# Patient Record
Sex: MALE | Race: BLACK OR AFRICAN AMERICAN | NOT HISPANIC OR LATINO | Employment: OTHER | URBAN - METROPOLITAN AREA
[De-identification: names, ages, dates, MRNs, and addresses within clinical notes are randomized per-mention and may not be internally consistent; named-entity substitution may affect disease eponyms.]

---

## 2022-09-16 ENCOUNTER — HOSPITAL ENCOUNTER (INPATIENT)
Facility: HOSPITAL | Age: 57
LOS: 1 days | Discharge: HOME | DRG: 202 | End: 2022-09-17
Attending: EMERGENCY MEDICINE | Admitting: INTERNAL MEDICINE
Payer: OTHER GOVERNMENT

## 2022-09-16 ENCOUNTER — APPOINTMENT (OUTPATIENT)
Dept: RADIOLOGY | Facility: HOSPITAL | Age: 57
DRG: 202 | End: 2022-09-16
Payer: OTHER GOVERNMENT

## 2022-09-16 DIAGNOSIS — J45.901 ASTHMA EXACERBATION: Primary | ICD-10-CM

## 2022-09-16 DIAGNOSIS — J96.90 RESPIRATORY FAILURE (HCC): ICD-10-CM

## 2022-09-16 PROBLEM — B20 HIV INFECTION (HCC): Status: ACTIVE | Noted: 2022-09-16

## 2022-09-16 PROBLEM — J45.41 MODERATE PERSISTENT ASTHMA WITH ACUTE EXACERBATION: Status: ACTIVE | Noted: 2022-09-16

## 2022-09-16 LAB
ANION GAP SERPL CALCULATED.3IONS-SCNC: 8 MMOL/L (ref 4–13)
BASOPHILS # BLD MANUAL: 0 THOUSAND/UL (ref 0–0.1)
BASOPHILS NFR MAR MANUAL: 0 % (ref 0–1)
BUN SERPL-MCNC: 14 MG/DL (ref 5–25)
CALCIUM SERPL-MCNC: 9.7 MG/DL (ref 8.4–10.2)
CHLORIDE SERPL-SCNC: 107 MMOL/L (ref 96–108)
CO2 SERPL-SCNC: 27 MMOL/L (ref 21–32)
CREAT SERPL-MCNC: 0.99 MG/DL (ref 0.6–1.3)
EOSINOPHIL # BLD MANUAL: 1.08 THOUSAND/UL (ref 0–0.4)
EOSINOPHIL NFR BLD MANUAL: 15 % (ref 0–6)
ERYTHROCYTE [DISTWIDTH] IN BLOOD BY AUTOMATED COUNT: 13.3 % (ref 11.6–15.1)
GFR SERPL CREATININE-BSD FRML MDRD: 85 ML/MIN/1.73SQ M
GLUCOSE SERPL-MCNC: 82 MG/DL (ref 65–140)
HCT VFR BLD AUTO: 42 % (ref 36.5–49.3)
HGB BLD-MCNC: 15 G/DL (ref 12–17)
LYMPHOCYTES # BLD AUTO: 2.72 THOUSAND/UL (ref 0.6–4.47)
LYMPHOCYTES # BLD AUTO: 38 % (ref 14–44)
MCH RBC QN AUTO: 30.1 PG (ref 26.8–34.3)
MCHC RBC AUTO-ENTMCNC: 35.7 G/DL (ref 31.4–37.4)
MCV RBC AUTO: 84 FL (ref 82–98)
MONOCYTES # BLD AUTO: 0.65 THOUSAND/UL (ref 0–1.22)
MONOCYTES NFR BLD: 9 % (ref 4–12)
NEUTROPHILS # BLD MANUAL: 2.29 THOUSAND/UL (ref 1.85–7.62)
NEUTS SEG NFR BLD AUTO: 32 % (ref 43–75)
PLATELET # BLD AUTO: 211 THOUSANDS/UL (ref 149–390)
PLATELET BLD QL SMEAR: ADEQUATE
PMV BLD AUTO: 11.6 FL (ref 8.9–12.7)
POTASSIUM SERPL-SCNC: 3.4 MMOL/L (ref 3.5–5.3)
RBC # BLD AUTO: 4.98 MILLION/UL (ref 3.88–5.62)
RBC MORPH BLD: NORMAL
SODIUM SERPL-SCNC: 142 MMOL/L (ref 135–147)
VARIANT LYMPHS # BLD AUTO: 6 %
WBC # BLD AUTO: 7.17 THOUSAND/UL (ref 4.31–10.16)

## 2022-09-16 PROCEDURE — 99291 CRITICAL CARE FIRST HOUR: CPT

## 2022-09-16 PROCEDURE — 94664 DEMO&/EVAL PT USE INHALER: CPT

## 2022-09-16 PROCEDURE — 96361 HYDRATE IV INFUSION ADD-ON: CPT

## 2022-09-16 PROCEDURE — 85027 COMPLETE CBC AUTOMATED: CPT | Performed by: PHYSICIAN ASSISTANT

## 2022-09-16 PROCEDURE — 96365 THER/PROPH/DIAG IV INF INIT: CPT

## 2022-09-16 PROCEDURE — 94760 N-INVAS EAR/PLS OXIMETRY 1: CPT

## 2022-09-16 PROCEDURE — 94644 CONT INHLJ TX 1ST HOUR: CPT

## 2022-09-16 PROCEDURE — 71045 X-RAY EXAM CHEST 1 VIEW: CPT

## 2022-09-16 PROCEDURE — 80048 BASIC METABOLIC PNL TOTAL CA: CPT | Performed by: PHYSICIAN ASSISTANT

## 2022-09-16 PROCEDURE — 85007 BL SMEAR W/DIFF WBC COUNT: CPT | Performed by: PHYSICIAN ASSISTANT

## 2022-09-16 PROCEDURE — 94002 VENT MGMT INPAT INIT DAY: CPT

## 2022-09-16 PROCEDURE — 99291 CRITICAL CARE FIRST HOUR: CPT | Performed by: PHYSICIAN ASSISTANT

## 2022-09-16 PROCEDURE — 96375 TX/PRO/DX INJ NEW DRUG ADDON: CPT

## 2022-09-16 PROCEDURE — 96372 THER/PROPH/DIAG INJ SC/IM: CPT

## 2022-09-16 PROCEDURE — 94640 AIRWAY INHALATION TREATMENT: CPT

## 2022-09-16 PROCEDURE — 36415 COLL VENOUS BLD VENIPUNCTURE: CPT | Performed by: PHYSICIAN ASSISTANT

## 2022-09-16 RX ORDER — EPINEPHRINE 1 MG/ML
0.3 INJECTION, SOLUTION, CONCENTRATE INTRAVENOUS ONCE
Status: COMPLETED | OUTPATIENT
Start: 2022-09-16 | End: 2022-09-16

## 2022-09-16 RX ORDER — ACETAMINOPHEN 325 MG/1
650 TABLET ORAL EVERY 6 HOURS PRN
Status: DISCONTINUED | OUTPATIENT
Start: 2022-09-16 | End: 2022-09-17 | Stop reason: HOSPADM

## 2022-09-16 RX ORDER — SODIUM CHLORIDE FOR INHALATION 0.9 %
3 VIAL, NEBULIZER (ML) INHALATION ONCE
Status: COMPLETED | OUTPATIENT
Start: 2022-09-16 | End: 2022-09-16

## 2022-09-16 RX ORDER — MAGNESIUM SULFATE HEPTAHYDRATE 40 MG/ML
2 INJECTION, SOLUTION INTRAVENOUS ONCE
Status: COMPLETED | OUTPATIENT
Start: 2022-09-16 | End: 2022-09-16

## 2022-09-16 RX ORDER — ALBUTEROL SULFATE 2.5 MG/3ML
5 SOLUTION RESPIRATORY (INHALATION) ONCE
Status: COMPLETED | OUTPATIENT
Start: 2022-09-16 | End: 2022-09-16

## 2022-09-16 RX ORDER — DEXAMETHASONE SODIUM PHOSPHATE 4 MG/ML
10 INJECTION, SOLUTION INTRA-ARTICULAR; INTRALESIONAL; INTRAMUSCULAR; INTRAVENOUS; SOFT TISSUE ONCE
Status: COMPLETED | OUTPATIENT
Start: 2022-09-16 | End: 2022-09-16

## 2022-09-16 RX ORDER — ALBUTEROL SULFATE 2.5 MG/3ML
2.5 SOLUTION RESPIRATORY (INHALATION) EVERY 4 HOURS PRN
Status: DISCONTINUED | OUTPATIENT
Start: 2022-09-16 | End: 2022-09-17 | Stop reason: HOSPADM

## 2022-09-16 RX ORDER — LEVALBUTEROL 1.25 MG/.5ML
1.25 SOLUTION, CONCENTRATE RESPIRATORY (INHALATION)
Status: DISCONTINUED | OUTPATIENT
Start: 2022-09-17 | End: 2022-09-17 | Stop reason: HOSPADM

## 2022-09-16 RX ORDER — HEPARIN SODIUM 5000 [USP'U]/ML
5000 INJECTION, SOLUTION INTRAVENOUS; SUBCUTANEOUS EVERY 8 HOURS SCHEDULED
Status: DISCONTINUED | OUTPATIENT
Start: 2022-09-16 | End: 2022-09-17 | Stop reason: HOSPADM

## 2022-09-16 RX ADMIN — ISODIUM CHLORIDE 3 ML: 0.03 SOLUTION RESPIRATORY (INHALATION) at 21:49

## 2022-09-16 RX ADMIN — IPRATROPIUM BROMIDE 1 MG: 0.5 SOLUTION RESPIRATORY (INHALATION) at 21:49

## 2022-09-16 RX ADMIN — IPRATROPIUM BROMIDE 0.5 MG: 0.5 SOLUTION RESPIRATORY (INHALATION) at 21:01

## 2022-09-16 RX ADMIN — ALBUTEROL SULFATE 10 MG: 2.5 SOLUTION RESPIRATORY (INHALATION) at 21:48

## 2022-09-16 RX ADMIN — MAGNESIUM SULFATE HEPTAHYDRATE 2 G: 40 INJECTION, SOLUTION INTRAVENOUS at 21:39

## 2022-09-16 RX ADMIN — DEXAMETHASONE SODIUM PHOSPHATE 10 MG: 4 INJECTION, SOLUTION INTRAMUSCULAR; INTRAVENOUS at 21:09

## 2022-09-16 RX ADMIN — EPINEPHRINE 0.3 MG: 1 INJECTION, SOLUTION, CONCENTRATE INTRAVENOUS at 21:43

## 2022-09-16 RX ADMIN — SODIUM CHLORIDE 1000 ML: 0.9 INJECTION, SOLUTION INTRAVENOUS at 21:08

## 2022-09-16 RX ADMIN — ALBUTEROL SULFATE 5 MG: 2.5 SOLUTION RESPIRATORY (INHALATION) at 21:00

## 2022-09-17 VITALS
OXYGEN SATURATION: 94 % | WEIGHT: 128.53 LBS | BODY MASS INDEX: 19.48 KG/M2 | RESPIRATION RATE: 37 BRPM | DIASTOLIC BLOOD PRESSURE: 70 MMHG | TEMPERATURE: 97.4 F | SYSTOLIC BLOOD PRESSURE: 132 MMHG | HEART RATE: 104 BPM | HEIGHT: 68 IN

## 2022-09-17 LAB
ALBUMIN SERPL BCP-MCNC: 3.9 G/DL (ref 3.5–5)
ALP SERPL-CCNC: 53 U/L (ref 34–104)
ALT SERPL W P-5'-P-CCNC: 9 U/L (ref 7–52)
ANION GAP SERPL CALCULATED.3IONS-SCNC: 14 MMOL/L (ref 4–13)
ANION GAP SERPL CALCULATED.3IONS-SCNC: 7 MMOL/L (ref 4–13)
AST SERPL W P-5'-P-CCNC: 17 U/L (ref 13–39)
BASOPHILS # BLD AUTO: 0.01 THOUSANDS/ΜL (ref 0–0.1)
BASOPHILS NFR BLD AUTO: 0 % (ref 0–1)
BILIRUB SERPL-MCNC: 0.3 MG/DL (ref 0.2–1)
BUN SERPL-MCNC: 14 MG/DL (ref 5–25)
BUN SERPL-MCNC: 15 MG/DL (ref 5–25)
CALCIUM SERPL-MCNC: 9 MG/DL (ref 8.4–10.2)
CALCIUM SERPL-MCNC: 9 MG/DL (ref 8.4–10.2)
CHLORIDE SERPL-SCNC: 104 MMOL/L (ref 96–108)
CHLORIDE SERPL-SCNC: 105 MMOL/L (ref 96–108)
CO2 SERPL-SCNC: 20 MMOL/L (ref 21–32)
CO2 SERPL-SCNC: 27 MMOL/L (ref 21–32)
CREAT SERPL-MCNC: 1.04 MG/DL (ref 0.6–1.3)
CREAT SERPL-MCNC: 1.2 MG/DL (ref 0.6–1.3)
EOSINOPHIL # BLD AUTO: 0.01 THOUSAND/ΜL (ref 0–0.61)
EOSINOPHIL NFR BLD AUTO: 0 % (ref 0–6)
ERYTHROCYTE [DISTWIDTH] IN BLOOD BY AUTOMATED COUNT: 13.5 % (ref 11.6–15.1)
GFR SERPL CREATININE-BSD FRML MDRD: 68 ML/MIN/1.73SQ M
GFR SERPL CREATININE-BSD FRML MDRD: 81 ML/MIN/1.73SQ M
GLUCOSE SERPL-MCNC: 108 MG/DL (ref 65–140)
GLUCOSE SERPL-MCNC: 145 MG/DL (ref 65–140)
HCT VFR BLD AUTO: 38.2 % (ref 36.5–49.3)
HGB BLD-MCNC: 13.8 G/DL (ref 12–17)
IMM GRANULOCYTES # BLD AUTO: 0.03 THOUSAND/UL (ref 0–0.2)
IMM GRANULOCYTES NFR BLD AUTO: 0 % (ref 0–2)
LYMPHOCYTES # BLD AUTO: 0.47 THOUSANDS/ΜL (ref 0.6–4.47)
LYMPHOCYTES NFR BLD AUTO: 7 % (ref 14–44)
MAGNESIUM SERPL-MCNC: 2 MG/DL (ref 1.9–2.7)
MCH RBC QN AUTO: 30.7 PG (ref 26.8–34.3)
MCHC RBC AUTO-ENTMCNC: 36.1 G/DL (ref 31.4–37.4)
MCV RBC AUTO: 85 FL (ref 82–98)
MONOCYTES # BLD AUTO: 0.05 THOUSAND/ΜL (ref 0.17–1.22)
MONOCYTES NFR BLD AUTO: 1 % (ref 4–12)
NEUTROPHILS # BLD AUTO: 6.17 THOUSANDS/ΜL (ref 1.85–7.62)
NEUTS SEG NFR BLD AUTO: 92 % (ref 43–75)
NRBC BLD AUTO-RTO: 0 /100 WBCS
PLATELET # BLD AUTO: 218 THOUSANDS/UL (ref 149–390)
PMV BLD AUTO: 11.7 FL (ref 8.9–12.7)
POTASSIUM SERPL-SCNC: 3.8 MMOL/L (ref 3.5–5.3)
POTASSIUM SERPL-SCNC: 4.6 MMOL/L (ref 3.5–5.3)
PROT SERPL-MCNC: 6.3 G/DL (ref 6.4–8.4)
RBC # BLD AUTO: 4.5 MILLION/UL (ref 3.88–5.62)
SODIUM SERPL-SCNC: 138 MMOL/L (ref 135–147)
SODIUM SERPL-SCNC: 139 MMOL/L (ref 135–147)
WBC # BLD AUTO: 6.74 THOUSAND/UL (ref 4.31–10.16)

## 2022-09-17 PROCEDURE — NC001 PR NO CHARGE: Performed by: NURSE PRACTITIONER

## 2022-09-17 PROCEDURE — 80053 COMPREHEN METABOLIC PANEL: CPT | Performed by: NURSE PRACTITIONER

## 2022-09-17 PROCEDURE — 94760 N-INVAS EAR/PLS OXIMETRY 1: CPT

## 2022-09-17 PROCEDURE — 99291 CRITICAL CARE FIRST HOUR: CPT | Performed by: NURSE PRACTITIONER

## 2022-09-17 PROCEDURE — 83735 ASSAY OF MAGNESIUM: CPT | Performed by: NURSE PRACTITIONER

## 2022-09-17 PROCEDURE — 80048 BASIC METABOLIC PNL TOTAL CA: CPT | Performed by: NURSE PRACTITIONER

## 2022-09-17 PROCEDURE — 85027 COMPLETE CBC AUTOMATED: CPT | Performed by: NURSE PRACTITIONER

## 2022-09-17 PROCEDURE — 94640 AIRWAY INHALATION TREATMENT: CPT

## 2022-09-17 RX ORDER — PREDNISONE 10 MG/1
20 TABLET ORAL DAILY
Qty: 1 TABLET | Refills: 0 | Status: SHIPPED | OUTPATIENT
Start: 2022-09-20 | End: 2022-09-21

## 2022-09-17 RX ORDER — PREDNISONE 20 MG/1
40 TABLET ORAL DAILY
Status: DISCONTINUED | OUTPATIENT
Start: 2022-09-18 | End: 2022-09-17 | Stop reason: HOSPADM

## 2022-09-17 RX ORDER — PREDNISONE 10 MG/1
10 TABLET ORAL DAILY
Qty: 1 TABLET | Refills: 0 | Status: SHIPPED | OUTPATIENT
Start: 2022-09-21 | End: 2022-09-22

## 2022-09-17 RX ORDER — PREDNISONE 20 MG/1
20 TABLET ORAL DAILY
Status: DISCONTINUED | OUTPATIENT
Start: 2022-09-20 | End: 2022-09-17 | Stop reason: HOSPADM

## 2022-09-17 RX ORDER — PREDNISONE 10 MG/1
30 TABLET ORAL DAILY
Qty: 3 TABLET | Refills: 0 | Status: SHIPPED | OUTPATIENT
Start: 2022-09-19 | End: 2022-09-20

## 2022-09-17 RX ORDER — PREDNISONE 10 MG/1
40 TABLET ORAL DAILY
Qty: 2 TABLET | Refills: 0 | Status: SHIPPED | OUTPATIENT
Start: 2022-09-18 | End: 2022-09-19

## 2022-09-17 RX ORDER — PREDNISONE 10 MG/1
10 TABLET ORAL DAILY
Status: DISCONTINUED | OUTPATIENT
Start: 2022-09-21 | End: 2022-09-17 | Stop reason: HOSPADM

## 2022-09-17 RX ORDER — SODIUM CHLORIDE, SODIUM GLUCONATE, SODIUM ACETATE, POTASSIUM CHLORIDE, MAGNESIUM CHLORIDE, SODIUM PHOSPHATE, DIBASIC, AND POTASSIUM PHOSPHATE .53; .5; .37; .037; .03; .012; .00082 G/100ML; G/100ML; G/100ML; G/100ML; G/100ML; G/100ML; G/100ML
500 INJECTION, SOLUTION INTRAVENOUS ONCE
Status: COMPLETED | OUTPATIENT
Start: 2022-09-17 | End: 2022-09-17

## 2022-09-17 RX ADMIN — HEPARIN SODIUM 5000 UNITS: 5000 INJECTION, SOLUTION INTRAVENOUS; SUBCUTANEOUS at 00:00

## 2022-09-17 RX ADMIN — PREDNISONE 50 MG: 10 TABLET ORAL at 13:22

## 2022-09-17 RX ADMIN — LEVALBUTEROL HYDROCHLORIDE 1.25 MG: 1.25 SOLUTION, CONCENTRATE RESPIRATORY (INHALATION) at 03:00

## 2022-09-17 RX ADMIN — IPRATROPIUM BROMIDE 0.5 MG: 0.5 SOLUTION RESPIRATORY (INHALATION) at 03:00

## 2022-09-17 RX ADMIN — HEPARIN SODIUM 5000 UNITS: 5000 INJECTION, SOLUTION INTRAVENOUS; SUBCUTANEOUS at 13:22

## 2022-09-17 RX ADMIN — HEPARIN SODIUM 5000 UNITS: 5000 INJECTION, SOLUTION INTRAVENOUS; SUBCUTANEOUS at 05:49

## 2022-09-17 RX ADMIN — IPRATROPIUM BROMIDE 0.5 MG: 0.5 SOLUTION RESPIRATORY (INHALATION) at 13:00

## 2022-09-17 RX ADMIN — IPRATROPIUM BROMIDE 0.5 MG: 0.5 SOLUTION RESPIRATORY (INHALATION) at 07:00

## 2022-09-17 RX ADMIN — LEVALBUTEROL HYDROCHLORIDE 1.25 MG: 1.25 SOLUTION, CONCENTRATE RESPIRATORY (INHALATION) at 07:00

## 2022-09-17 RX ADMIN — LEVALBUTEROL HYDROCHLORIDE 1.25 MG: 1.25 SOLUTION, CONCENTRATE RESPIRATORY (INHALATION) at 13:00

## 2022-09-17 RX ADMIN — SODIUM CHLORIDE, SODIUM GLUCONATE, SODIUM ACETATE, POTASSIUM CHLORIDE, MAGNESIUM CHLORIDE, SODIUM PHOSPHATE, DIBASIC, AND POTASSIUM PHOSPHATE 500 ML: .53; .5; .37; .037; .03; .012; .00082 INJECTION, SOLUTION INTRAVENOUS at 08:00

## 2022-09-17 NOTE — H&P
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 [employer] and developed SOB  · Home neb tx x2-received albutrerol neb 5 mg, montanez neb, decadron, epi and magnesium in ED  · Required BiPap 10/5 40% for short period of time and was transitioned to 4 L NC upon arrival to ICU  · Continue scheduled nebs  · Will monitor off steroids for now  · O2 prn SpO2>92%    HIV infection ([facility] [address] )  Assessment & Plan  · Unknown when last CD 4 count  · Continue home Dolutegravir/Rilpivirine 50 mg/25 mg    -------------------------------------------------------------------------------------------------------------  Chief Complaint: "Shortness of breath"    History of Present Illness     Silvia Charles is a 49 y o  male with a past medical history of HIV and asthma who presents with shortness of breath  Patient states he was cooking rice at [employer] and developed acute shortness of breath that was not relieved with 3 home nebs  In the ED was placed on BiPAP for increased SOB and WOB  He received albuterol nebulizer, Decadron, Epinephrine IM, and magnesium Sulfate IV  Patient has history of being intubated in past with the most recent being this past July  CC was consulted for admission  Patient admitted as SD 1 for continuous BiPAP and monitoring  History obtained from chart review and the patient   -------------------------------------------------------------------------------------------------------------  Dispo:  Admit to Stepdown Level 1    Code Status: Level 1 - Full Code  --------------------------------------------------------------------------------------------------------------  Review of Systems   Constitutional: Negative for chills, fatigue and fever  HENT: Negative for trouble swallowing  Respiratory: Positive for shortness of breath and wheezing  Cardiovascular: Negative for chest pain, palpitations and leg swelling  Gastrointestinal: Negative for abdominal distention, abdominal pain, constipation, diarrhea, nausea and vomiting  Genitourinary: Negative for difficulty urinating  Musculoskeletal: Negative for arthralgias and gait problem  Neurological: Negative for dizziness, weakness and light-headedness  Psychiatric/Behavioral: The patient is not nervous/anxious  All other systems reviewed and are negative  A 12-point, complete review of systems was reviewed and negative except as stated above     Physical Exam  Constitutional:       General: He is not in acute distress  HENT:      Head: Normocephalic and atraumatic  Mouth/Throat:      Mouth: Mucous membranes are moist    Eyes:      Extraocular Movements: Extraocular movements intact  Conjunctiva/sclera: Conjunctivae normal       Pupils: Pupils are equal, round, and reactive to light  Cardiovascular:      Rate and Rhythm: Regular rhythm  Tachycardia present  Pulses: Normal pulses  Pulmonary:      Effort: Pulmonary effort is normal  No respiratory distress  Breath sounds: Wheezing present  Comments: Expiratory wheezes noted throughout  Abdominal:      General: Abdomen is flat  Bowel sounds are normal  There is no distension  Palpations: Abdomen is soft  Tenderness: There is no abdominal tenderness  Musculoskeletal:         General: Normal range of motion  Cervical back: Normal range of motion  Right lower leg: No edema  Left lower leg: No edema  Skin:     General: Skin is warm and dry  Capillary Refill: Capillary refill takes less than 2 seconds  Neurological:      General: No focal deficit present  Mental Status: He is oriented to person, place, and time  GCS: GCS eye subscore is 4  GCS verbal subscore is 5  GCS motor subscore is 6  Cranial Nerves: Cranial nerves are intact  Sensory: Sensation is intact  Psychiatric:         Mood and Affect: Mood normal          Speech: Speech normal          Behavior: Behavior normal          Thought Content: Thought content normal        --------------------------------------------------------------------------------------------------------------  Vitals:   Vitals:    09/16/22 2300 09/16/22 2340 09/16/22 2341 09/16/22 2349   BP: 124/67   126/79   BP Location: Left arm      Pulse: (!) 109 105 101 100   Resp: 14 20 18 17   Temp:    (!) 96 5 °F (35 8 °C)   TempSrc:    Tympanic   SpO2: 97% 99% 99% 97%   Weight:  58 3 kg (128 lb 8 5 oz)     Height:  5' 8" (1 727 m)       Temp  Min: 96 3 °F (35 7 °C)  Max: 96 5 °F (35 8 °C)  IBW (Ideal Body Weight): 68 4 kg  Height: 5' 8" (172 7 cm)  Body mass index is 19 54 kg/m²  Laboratory and Diagnostics:  Results from last 7 days   Lab Units 09/16/22  2107   WBC Thousand/uL 7 17   HEMOGLOBIN g/dL 15 0   HEMATOCRIT % 42 0   PLATELETS Thousands/uL 211   MONO PCT % 9     Results from last 7 days   Lab Units 09/16/22 2107   SODIUM mmol/L 142   POTASSIUM mmol/L 3 4*   CHLORIDE mmol/L 107   CO2 mmol/L 27   ANION GAP mmol/L 8   BUN mg/dL 14   CREATININE mg/dL 0 99   CALCIUM mg/dL 9 7   GLUCOSE RANDOM mg/dL 82                       ABG:    VBG:          Micro:        EKG: sinus tachycardia  Imaging:  XR chest 1 view portable    (Results Pending)    I have personally reviewed pertinent reports  and I have personally reviewed pertinent films in PACS      Historical Information   Past Medical History:   Diagnosis Date    Asthma      No past surgical history on file    Social History   Social History     Substance and Sexual Activity   Alcohol Use Never     Social History     Substance and Sexual Activity   Drug Use Never     Social History     Tobacco Use   Smoking Status Never Smoker   Smokeless Tobacco Not on file Exercise History: Ad Ramya  Family History:   No family history on file  I have reviewed this patient's family history and commented on sigificant items within the HPI      Medications:  Current Facility-Administered Medications   Medication Dose Route Frequency    acetaminophen (TYLENOL) tablet 650 mg  650 mg Oral Q6H PRN    albuterol inhalation solution 2 5 mg  2 5 mg Nebulization Q4H PRN    heparin (porcine) subcutaneous injection 5,000 Units  5,000 Units Subcutaneous Q8H [address]    ipratropium (ATROVENT) 0 02 % inhalation solution 0 5 mg  0 5 mg Nebulization Q6H    levalbuterol (XOPENEX) inhalation solution 1 25 mg  1 25 mg Nebulization Q6H     Home medications:  None     Allergies:  No Known Allergies    ------------------------------------------------------------------------------------------------------------  Advance Directive and Living Will:      Power of :    POLST:    ------------------------------------------------------------------------------------------------------------  Anticipated Length of Stay is > 2 midnights    Care Time Delivered:   Upon my evaluation, this patient had a high probability of imminent or life-threatening deterioration due to acute asthma exacerbation, which required my direct attention, intervention, and personal management  I have personally provided 30 minutes (2330 to 0000) of critical care time, exclusive of procedures, teaching, family meetings, and any prior time recorded by providers other than myself  MARCIA John        Portions of the record may have been created with voice recognition software  Occasional wrong word or "sound a like" substitutions may have occurred due to the inherent limitations of voice recognition software    Read the chart carefully and recognize, using context, where substitutions have occurred

## 2022-09-17 NOTE — UTILIZATION REVIEW
Initial Clinical Review    Admission: Date/Time/Statement:   Admission Orders (From admission, onward)     Ordered        09/16/22 2244  INPATIENT ADMISSION  Once                      Orders Placed This Encounter   Procedures    INPATIENT ADMISSION     Standing Status:   Standing     Number of Occurrences:   1     Order Specific Question:   Level of Care     Answer:   Level 1 Stepdown [13]     Order Specific Question:   Estimated length of stay     Answer:   More than 2 Midnights     Order Specific Question:   Certification     Answer:   I certify that inpatient services are medically necessary for this patient for a duration of greater than two midnights  See H&P and MD Progress Notes for additional information about the patient's course of treatment  ED Arrival Information     Expected   -    Arrival   9/16/2022 20:39    Acuity   Emergent            Means of arrival   Walk-In    Escorted by   Friend    Service   Critical Care/ICU    Admission type   Emergency            Arrival complaint   asthma trouble breathing             Chief Complaint   Patient presents with    Asthma     Took 3 neb tx with no relief  Able to speak in short sentences  +wheezing       Initial Presentation: 49 y o  male W/PMHX: HIV INFECTION, ASTHMA, past h/o being intubated with most recent hospitalization 7/22 to ED from home,  admitted as Inpatient Critical care L1SD, due to Asthma with acute Exacerbation  Presented with acute onset of  SOB, while cooking, not relived with 3 home nebs, Exam Tachycardia, Expiratory Wheezing throughout, GCS 15, ED work up reveals placed on BiPAP, for Increased Work of Breathing, and Increased SOB, received nebs, IV decadron, IM Epinephrine, Mag  Sulfate IV, Critical care consulted for admission   D/T need for Continuous BiPAP 10/5/ 40%,  Hypokalemia 3 5, EKG ST, CXR: NAD, HIV infection with last unknown CD 4 count, Plan: BiPAP 10/5, 40%, transistioned to 4 L NC upon arrival to ICU, c/w scheduled nebs, monitor off steroids, trend 02 SATs with Sp 02>92%, c/w home Dolutegravir/Rilpivirine 50 mg/25 mg, trend serial labs with repletion as needed, DVT PPX, Critical Cardio pulmonary monitoring  Date: 9/17/22   Day 2: D/C summary:  Transitioned off Bipap to 4L NC on arrival to [address] [place] [address] unit  Overnight wheezing subsided, oxygen was weaned off  Patient remained stable on room air  Noted to have mild increase in renal function on AM labs, was given 500cc bolus of isolyte and repeat labs showed improvement  Given 50mg Prednisone and discharged with taper       ED Triage Vitals   Temperature Pulse Respirations Blood Pressure SpO2   09/16/22 2104 09/16/22 2050 09/16/22 2050 09/16/22 2050 09/16/22 2050   (!) 96 3 °F (35 7 °C) (!) 118 22 156/84 93 %      Temp Source Heart Rate Source Patient Position - Orthostatic VS BP Location FiO2 (%)   09/16/22 2104 09/16/22 2050 09/16/22 2050 09/16/22 2050 --   Tympanic Monitor Sitting Right arm       Pain Score       09/16/22 2050       9          Wt Readings from Last 1 Encounters:   09/17/22 58 3 kg (128 lb 8 5 oz)     Additional Vital Signs:   Date/Time Temp Pulse Resp BP MAP (mmHg) SpO2 Calculated FIO2 (%) - Nasal Cannula Nasal Cannula O2 Flow Rate (L/min) O2 Device O2 Interface Device Patient Position - Orthostatic VS   09/17/22 1200 -- 104 37 Abnormal  132/70 88 94 % -- -- None (Room air) -- --   09/17/22 1100 -- 105 19 132/71 96 95 % -- -- None (Room air) -- --   09/17/22 1000 -- 105 22 127/65 90 92 % -- -- None (Room air) -- --   09/17/22 0900 -- 107 Abnormal  21 108/57 74 96 % -- -- None (Room air) -- --   09/17/22 0800 -- 104 18 93/51 68 94 % -- -- None (Room air) -- --   09/17/22 0700 97 4 °F (36 3 °C) Abnormal  102 20 108/55 59 Abnormal  95 % -- -- -- -- Lying   09/17/22 0600 -- 109 Abnormal  25 Abnormal  96/62 75 92 % -- -- None (Room air) -- --   09/17/22 0400 96 8 °F (36 °C) Abnormal  -- -- -- -- -- -- -- -- -- --   09/17/22 0300 -- 97 16 -- -- 97 % 28 2 L/min   Nasal cannula -- --   Nasal Cannula O2 Flow Rate (L/min): decreased from 3 5 at 09/17/22 0300   09/16/22 2349 96 5 °F (35 8 °C) Abnormal  100 17 126/79 97 97 % 36 4 L/min Nasal cannula -- --   09/16/22 2341 -- 101 18 -- -- 99 % 36 4 L/min Nasal cannula -- --   09/16/22 2340 -- 105 20 -- -- 99 % 36 4 L/min Nasal cannula -- --   09/16/22 2300 -- 109 Abnormal  14 124/67 89 97 % -- -- BiPAP -- Sitting   09/16/22 2230 -- 114 Abnormal  -- 124/70 89 97 % -- -- -- -- --   09/16/22 2148 -- -- -- -- -- 95 % -- -- -- Full face mask --   09/16/22 2130 -- 116 Abnormal  24 Abnormal  147/95 112 100 % -- -- -- -- --   09/16/22 2120 -- 112 Abnormal  24 Abnormal  116/82 95 100 % 28 2 L/min Nasal cannula -- --   09/16/22 2105 -- -- -- -- -- -- -- -- Nasal cannula -- --   09/16/22 2104 96 3 °F (35 7 °C) Abnormal  -- -- -- -- -- -- -- -- -- --       Pertinent Labs/Diagnostic Test Results:   XR chest 1 view portable   Final Result by Anita Rios MD (09/17 2936)      No radiographic evidence of acute intrathoracic process                    Workstation performed: [id number]               Results from last 7 days   Lab Units 09/17/22  0559 09/16/22  2107   WBC Thousand/uL 6 74 7 17   HEMOGLOBIN g/dL 13 8 15 0   HEMATOCRIT % 38 2 42 0   PLATELETS Thousands/uL 218 211   NEUTROS ABS Thousands/µL 6 17  --          Results from last 7 days   Lab Units 09/17/22 0559 09/16/22 2107   SODIUM mmol/L 139 142   POTASSIUM mmol/L 4 6 3 4*   CHLORIDE mmol/L 105 107   CO2 mmol/L 20* 27   ANION GAP mmol/L 14* 8   BUN mg/dL 15 14   CREATININE mg/dL 1 20 0 99   EGFR ml/min/1 73sq m 68 85   CALCIUM mg/dL 9 0 9 7   MAGNESIUM mg/dL 2 0  --      Results from last 7 days   Lab Units 09/17/22  0559   AST U/L 17   ALT U/L 9   ALK PHOS U/L 53   TOTAL PROTEIN g/dL 6 3*   ALBUMIN g/dL 3 9   TOTAL BILIRUBIN mg/dL 0 30         Results from last 7 days   Lab Units 09/17/22  0559 09/16/22  2107   GLUCOSE RANDOM mg/dL 145* 82             ED Treatment:   Medication Administration from 09/16/2022 2039 to 09/16/2022 2320       Date/Time Order Dose Route Action     09/16/2022 2101 ipratropium (ATROVENT) 0 02 % inhalation solution 0 5 mg 0 5 mg Nebulization Given     09/16/2022 2100 albuterol inhalation solution 5 mg 5 mg Nebulization Given     09/16/2022 2109 dexamethasone (DECADRON) injection 10 mg 10 mg Intravenous Given     09/16/2022 2108 sodium chloride 0 9 % bolus 1,000 mL 1,000 mL Intravenous New Bag     09/16/2022 2139 magnesium sulfate 2 g/50 mL IVPB (premix) 2 g 2 g Intravenous New Bag     09/16/2022 2148 albuterol inhalation solution 10 mg 10 mg Nebulization Given     09/16/2022 2149 ipratropium (ATROVENT) 0 02 % inhalation solution 1 mg 1 mg Nebulization Given     09/16/2022 2149 sodium chloride 0 9 % inhalation solution 3 mL 3 mL Nebulization Given     09/16/2022 2143 EPINEPHrine PF (ADRENALIN) 1 mg/mL injection 0 3 mg 0 3 mg Intramuscular Given        Past Medical History:   Diagnosis Date    Asthma     HIV (human immunodeficiency virus infection) ([facility] )      Present on Admission:   HIV infection ([facility] )   Asthma with acute exacerbation      Admitting Diagnosis: [facility] ) [J96 90]  Asthma [J45 909]  Asthma exacerbation [J45 901]  Age/Sex: 49 y o  male  Admission Orders:[facility] icu assessment 2 x day, fall precautions, turn pt q 2 h, daily wts, dysphasia assessment, scd, up with assistance  Scheduled Medications:  heparin (porcine), 5,000 Units, Subcutaneous, Q8H NATALYA  ipratropium, 0 5 mg, Nebulization, Q6H  levalbuterol, 1 25 mg, Nebulization, Q6H      Continuous IV Infusions:     PRN Meds:  acetaminophen, 650 mg, Oral, Q6H PRN  albuterol, 2 5 mg, Nebulization, Q4H PRN        IP CONSULT TO CASE MANAGEMENT    Network Utilization Review Department  ATTENTION: Please call with any questions or concerns to [phone] and carefully listen to the prompts so that you are directed to the right person   All voicemails are confidential   Maliha Seymour all requests for admission clinical reviews, approved or denied determinations and any other requests to dedicated fax number below belonging to the campus where the patient is receiving treatment   List of dedicated fax numbers for the Facilities:  [address] [place] [address] DENIALS (Administrative/Medical Necessity) [phone]   [address] (Maternity/NICU/Pediatrics) [phone]   [address] [place] [address]  [address] [facility] [place] [phone] [address] [facility] [address] [place] [address] [phone]

## 2022-09-17 NOTE — ED PROVIDER NOTES
History  Chief Complaint   Patient presents with    Asthma     Took 3 neb tx with no relief  Able to speak in short sentences  +wheezing     51-year-old male presents to the ER in respiratory distress  He reports to be a severe asthmatic and had 3 or 4 neb treatments prior to arrival   He is able to speak only in short phrases  He does have some wheezing on auscultation  Increased respiratory effort and atraumatically prolonged expiration  Patient does exhibit some retractions when breathing  Immediate interventions were undertaken given the patient's presenting symptoms  Minimal history and ROS obtained due to patient's profound difficulty breathing  He states that he has been intubated multiple times for asthma as recently as this past July  Allergies reviewed          None       Past Medical History:   Diagnosis Date    Asthma     HIV (human immunodeficiency virus infection) ([facility] [address] )        No past surgical history on file  No family history on file  I have reviewed and agree with the history as documented  E-Cigarette/Vaping     E-Cigarette/Vaping Substances     Social History     Tobacco Use    Smoking status: Never Smoker   Substance Use Topics    Alcohol use: Never    Drug use: Never       Review of Systems   Unable to perform ROS: Severe respiratory distress   Respiratory: Positive for shortness of breath and wheezing  Physical Exam  Physical Exam  Vitals and nursing note reviewed  Constitutional:       General: He is in acute distress  Appearance: Normal appearance  He is well-developed  HENT:      Head: Normocephalic and atraumatic  Right Ear: External ear normal       Left Ear: External ear normal       Nose: Nose normal    Eyes:      Conjunctiva/sclera: Conjunctivae normal       Pupils: Pupils are equal, round, and reactive to light  Cardiovascular:      Rate and Rhythm: Regular rhythm  Tachycardia present  Heart sounds: Normal heart sounds   No murmur heard     No friction rub  No gallop  Pulmonary:      Effort: Accessory muscle usage, prolonged expiration and respiratory distress present  Breath sounds: No stridor  Wheezing present  No rales  Abdominal:      General: Bowel sounds are normal  There is no distension  Palpations: Abdomen is soft  Tenderness: There is no abdominal tenderness  There is no guarding  Musculoskeletal:         General: No tenderness  Normal range of motion  Cervical back: Normal range of motion and neck supple  Skin:     General: Skin is warm and dry  Capillary Refill: Capillary refill takes less than 2 seconds  Neurological:      Mental Status: He is alert and oriented to person, place, and time  Psychiatric:         Behavior: Behavior is cooperative           Vital Signs  ED Triage Vitals   Temperature Pulse Respirations Blood Pressure SpO2   09/16/22 2104 09/16/22 2050 09/16/22 2050 09/16/22 2050 09/16/22 2050   (!) 96 3 °F (35 7 °C) (!) 118 22 156/84 93 %      Temp Source Heart Rate Source Patient Position - Orthostatic VS BP Location FiO2 (%)   09/16/22 2104 09/16/22 2050 09/16/22 2050 09/16/22 2050 --   Tympanic Monitor Sitting Right arm       Pain Score       09/16/22 2050       9           Vitals:    09/17/22 0900 09/17/22 1000 09/17/22 1100 09/17/22 1200   BP: 108/57 127/65 132/71 132/70   Pulse: (!) 107 105 105 104   Patient Position - Orthostatic VS:             Visual Acuity      ED Medications  Medications   ipratropium (ATROVENT) 0 02 % inhalation solution 0 5 mg (0 5 mg Nebulization Given 9/16/22 2101)   albuterol inhalation solution 5 mg (5 mg Nebulization Given 9/16/22 2100)   dexamethasone (DECADRON) injection 10 mg (10 mg Intravenous Given 9/16/22 2109)   sodium chloride 0 9 % bolus 1,000 mL (0 mL Intravenous Stopped 9/16/22 2208)   magnesium sulfate 2 g/50 mL IVPB (premix) 2 g (0 g Intravenous Stopped 9/16/22 2209)   albuterol inhalation solution 10 mg (10 mg Nebulization Given 9/16/22 2148)     And   ipratropium (ATROVENT) 0 02 % inhalation solution 1 mg (1 mg Nebulization Given 9/16/22 2149)     And   sodium chloride 0 9 % inhalation solution 3 mL (3 mL Nebulization Given 9/16/22 2149)   EPINEPHrine PF (ADRENALIN) 1 mg/mL injection 0 3 mg (0 3 mg Intramuscular Given 9/16/22 2143)   multi-electrolyte (ISOLYTE-S PH 7 4) bolus 500 mL (500 mL Intravenous New Bag 9/17/22 0800)   predniSONE tablet 50 mg (50 mg Oral Given 9/17/22 1322)       Diagnostic Studies  Results Reviewed     Procedure Component Value Units Date/Time    Manual Differential(PHLEBS Do Not Order) [[id number]]  (Abnormal) Collected: 09/16/22 2107    Lab Status: Final result Specimen: Blood from Arm, Left Updated: 09/16/22 2202     Segmented % 32 %      Lymphocytes % 38 %      Monocytes % 9 %      Eosinophils, % 15 %      Basophils % 0 %      Atypical Lymphocytes % 6 %      Absolute Neutrophils 2 29 Thousand/uL      Lymphocytes Absolute 2 72 Thousand/uL      Monocytes Absolute 0 65 Thousand/uL      Eosinophils Absolute 1 08 Thousand/uL      Basophils Absolute 0 00 Thousand/uL      Total Counted --     RBC Morphology Normal     Platelet Estimate Adequate    CBC and differential [[id number]]  (Normal) Collected: 09/16/22 2107    Lab Status: Final result Specimen: Blood from Arm, Left Updated: 09/16/22 2202     WBC 7 17 Thousand/uL      RBC 4 98 Million/uL      Hemoglobin 15 0 g/dL      Hematocrit 42 0 %      MCV 84 fL      MCH 30 1 pg      MCHC 35 7 g/dL      RDW 13 3 %      MPV 11 6 fL      Platelets 716 Thousands/uL     Narrative: This is an appended report  These results have been appended to a previously verified report      Basic metabolic panel [[id number]]  (Abnormal) Collected: 09/16/22 2107    Lab Status: Final result Specimen: Blood from Arm, Left Updated: 09/16/22 2137     Sodium 142 mmol/L      Potassium 3 4 mmol/L      Chloride 107 mmol/L      CO2 27 mmol/L      ANION GAP 8 mmol/L      BUN 14 mg/dL      Creatinine 0 99 mg/dL      Glucose 82 mg/dL      Calcium 9 7 mg/dL      eGFR 85 ml/min/1 73sq m     Narrative:      Srinivas guidelines for Chronic Kidney Disease (CKD):     Stage 1 with normal or high GFR (GFR > 90 mL/min/1 73 square meters)    Stage 2 Mild CKD (GFR = 60-89 mL/min/1 73 square meters)    Stage 3A Moderate CKD (GFR = 45-59 mL/min/1 73 square meters)    Stage 3B Moderate CKD (GFR = 30-44 mL/min/1 73 square meters)    Stage 4 Severe CKD (GFR = 15-29 mL/min/1 73 square meters)    Stage 5 End Stage CKD (GFR <15 mL/min/1 73 square meters)  Note: GFR calculation is accurate only with a steady state creatinine                 XR chest 1 view portable   Final Result by Hill Cantor MD (09/17 7263)      No radiographic evidence of acute intrathoracic process                    Workstation performed: [id number]                    Procedures  CriticalCare Time  Performed by: Kasia Martins PA-C  Authorized by: Kasia Martins PA-C     Critical care provider statement:     Critical care time (minutes):  35    Critical care time was exclusive of:  Separately billable procedures and treating other patients and teaching time    Critical care was necessary to treat or prevent imminent or life-threatening deterioration of the following conditions:  Respiratory failure    Critical care was time spent personally by me on the following activities:  Blood draw for specimens, obtaining history from patient or surrogate, development of treatment plan with patient or surrogate, discussions with consultants, evaluation of patient's response to treatment, examination of patient, interpretation of cardiac output measurements, ordering and performing treatments and interventions, ordering and review of laboratory studies, ordering and review of radiographic studies, re-evaluation of patient's condition and review of old charts             ED Course                               SBIRT 22yo+    Flowsheet Row Most Recent Value   SBIRT (25 yo +)    In order to provide better care to our patients, we are screening all of our patients for alcohol and drug use  Would it be okay to ask you these screening questions? Unable to answer at this time Filed at: 09/16/2022 2105                    MDM  Number of Diagnoses or Management Options  Diagnosis management comments: Case discussed with attending physician  Attending physician did evaluate the patient at bedside  Patient was given multiple medications to assist with asthma exacerbation  Patient began to clinically improve on BiPAP  Plan to disposition to ICU for further monitoring and management  Patient is agreeable to this plan  Amount and/or Complexity of Data Reviewed  Clinical lab tests: ordered and reviewed  Tests in the radiology section of CPT®: ordered and reviewed    Risk of Complications, Morbidity, and/or Mortality  Presenting problems: high  Diagnostic procedures: moderate  Management options: moderate    Patient Progress  Patient progress: stable      Disposition  Final diagnoses:   Asthma exacerbation   Respiratory failure ([address] )     Time reflects when diagnosis was documented in both MDM as applicable and the Disposition within this note     Time User Action Codes Description Comment    9/16/2022 10:38 PM Kaur Barbosa Add [J45 901] Asthma exacerbation     9/16/2022 10:43 PM Roman Hodgkins, Evalyn Shiley Add [J96 90] Respiratory failure [facility])       ED Disposition     ED Disposition   Admit    Condition   Stable    Date/Time   Fri Sep 16, 2022 10:43 PM    Comment   Case was discussed with CC and the patient's admission status was agreed to be Admission Status: inpatient status to the service of Dr Torin Cast   Follow-up Information    None         Discharge Medication List as of 9/17/2022  2:02 PM      START taking these medications    Details   ! ! predniSONE 10 mg tablet Take 4 tablets (40 mg total) by mouth daily for 1 dose, Starting Sun 9/18/2022, Until Mon 9/19/2022, Normal      !! predniSONE 10 mg tablet Take 3 tablets (30 mg total) by mouth daily for 1 dose, Starting Mon 9/19/2022, Until Tue 9/20/2022, Normal      !! predniSONE 10 mg tablet Take 2 tablets (20 mg total) by mouth daily for 1 dose, Starting Tue 9/20/2022, Until Wed 9/21/2022, Normal      !! predniSONE 10 mg tablet Take 1 tablet (10 mg total) by mouth daily for 1 dose, Starting Wed 9/21/2022, Until Thu 9/22/2022, Normal       !! - Potential duplicate medications found  Please discuss with provider            Outpatient Discharge Orders   Activity as tolerated     Call provider for:  difficulty breathing, headache or visual disturbances       PDMP Review     None          ED Provider  Electronically Signed by           Chris Ledezma PA-C  09/19/22 8239

## 2022-09-17 NOTE — ASSESSMENT & PLAN NOTE
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

## 2022-09-17 NOTE — PLAN OF CARE
Problem: Potential for Falls  Goal: Patient will remain free of falls  Description: INTERVENTIONS:  - Educate patient/family on patient safety including physical limitations  - Instruct patient to call for assistance with activity   - Consult OT/PT to assist with strengthening/mobility   - Keep Call bell within reach  - Keep bed low and locked with side rails adjusted as appropriate  - Keep care items and personal belongings within reach  - Initiate and maintain comfort rounds  - Make Fall Risk Sign visible to staff  - Offer Toileting every  Hours, in advance of need  - Initiate/Maintain alarm  - Obtain necessary fall risk management equipment:   - Apply yellow socks and bracelet for high fall risk patients  - Consider moving patient to room near nurses station  Outcome: Progressing     Problem: RESPIRATORY - ADULT  Goal: Achieves optimal ventilation and oxygenation  Description: INTERVENTIONS:  - Assess for changes in respiratory status  - Assess for changes in mentation and behavior  - Position to facilitate oxygenation and minimize respiratory effort  - Oxygen administered by appropriate delivery if ordered  - Initiate smoking cessation education as indicated  - Encourage broncho-pulmonary hygiene including cough, deep breathe, Incentive Spirometry  - Assess the need for suctioning and aspirate as needed  - Assess and instruct to report SOB or any respiratory difficulty  - Respiratory Therapy support as indicated  Outcome: Progressing

## 2022-09-17 NOTE — RESPIRATORY THERAPY NOTE
09/17/22 0300   Respiratory Assessment   Assessment Type Pre-treatment   General Appearance Awake;Drowsy   Respiratory Pattern Normal   Chest Assessment Chest expansion symmetrical   Bilateral Breath Sounds Diminished;Coarse   Resp Comments pt woken for tx no distress decreased fio2 to 2 l/m   O2 Device nc   Oxygen Therapy/Pulse Ox   O2 Device Nasal cannula   O2 Therapy Oxygen humidified   Nasal Cannula O2 Flow Rate (L/min) (S)  2 L/min  (decreased from 3 5)   Calculated FIO2 (%) - Nasal Cannula 28   SpO2 97 %   SpO2 Activity At Rest   $ Pulse Oximetry Spot Check Charge Completed

## 2022-09-17 NOTE — RESPIRATORY THERAPY NOTE
RT Protocol Note  Eduardo Baker 49 y o  male MRN: [id number]  Unit/Bed#: ICU 03-01 Encounter: [id number]    Assessment    Principal Problem:    Asthma with acute exacerbation  Active Problems:    HIV infection ([address] )      Home Pulmonary Medications:    Home Devices/Therapy:  (MDI alb nebs prn and MDi prn, Symbicort BID no other therapy)    Past Medical History:   Diagnosis Date    Asthma      Social History     Socioeconomic History    Marital status: Single     Spouse name: Not on file    Number of children: Not on file    Years of education: Not on file    Highest education level: Not on file   Occupational History    Not on file   Tobacco Use    Smoking status: Never Smoker    Smokeless tobacco: Not on file   Substance and Sexual Activity    Alcohol use: Never    Drug use: Never    Sexual activity: Not on file   Other Topics Concern    Not on file   Social History Narrative    Not on file     Social Determinants of Health     Financial Resource Strain: Not on file   Food Insecurity: Not on file   Transportation Needs: Not on file   Physical Activity: Not on file   Stress: Not on file   Social Connections: Not on file   Intimate Partner Violence: Not on file   Housing Stability: Not on file       Subjective    Subjective Data: bipap on STBY    Objective    Physical Exam:   Assessment Type: Assess only  General Appearance: Awake, Alert  Respiratory Pattern: Normal, Dyspnea with exertion  Chest Assessment: Chest expansion symmetrical  Bilateral Breath Sounds: Diminished, Scattered, Expiratory wheezes (faint)  Cough: Strong, Dry, Non-productive  O2 Device: nc    Vitals:  Blood pressure 124/67, pulse 101, temperature (!) 96 3 °F (35 7 °C), temperature source Tympanic, resp  rate 18, height 5' 8" (1 727 m), weight 58 3 kg (128 lb 8 5 oz), SpO2 99 %  Imaging and other studies: I have personally reviewed pertinent reports        O2 Device: nc     Plan    Respiratory Plan: No distress/Pulmonary history Resp Comments: recieved pt from er on bipap but removed to NC 4l/m marva well, pt in no distress, no use of accessory muscle use, pt has Astham hx with intubations in past, has allergies to trees, will cont with nebs as ordereded, BS jayesh exp wheeze rec cont neb in er prior to icu

## 2022-09-17 NOTE — DISCHARGE SUMMARY
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 [employer] and developed acute shortness of breath that was not relieved with 3 home nebs  In the ED was placed on BiPAP for increased SOB and WOB  He received albuterol nebulizer, Decadron, Epinephrine IM, and magnesium Sulfate IV  Patient has history of being intubated in past with the most recent being this past July  CC was consulted for admission  Patient admitted as SD 1 for continuous BiPAP and monitoring "    Procedures Performed:   Orders Placed This Encounter   Procedures   [address] Course: Transitioned off Bipap to 4L NC on arrival to [address] [place] [address] unit  Overnight wheezing subsided, oxygen was weaned off  Patient remained stable on room air  Noted to have mild increase in renal function on AM labs, was given 500cc bolus of isolyte and repeat labs showed improvement  Given 50mg Prednisone and discharged with taper  Significant Findings, Care, Treatment and Services Provided: n/a    Complications: n/a    Discharge Diagnosis: Asthma exacerbation    Medical Problems             Resolved Problems  Date Reviewed: 9/17/2022   None                 Condition at Discharge: stable         Discharge instructions/Information to patient and family:   See after visit summary for information provided to patient and family        Provisions for Follow-Up Care:  See after visit summary for information related to follow-up care and any pertinent home health orders  PCP: No primary care provider on file  Disposition: Home    Planned Readmission: No      Discharge Statement   I spent 10 minutes discharging the patient  This time was spent on the day of discharge  I had direct contact with the patient on the day of discharge  Additional documentation is required if more than 30 minutes were spent on discharge  Discharge Medications:  See after visit summary for reconciled discharge medications provided to patient and family

## 2022-09-17 NOTE — CASE MANAGEMENT
Case Management Assessment & Discharge Planning Note    Patient name Parveen Kapadia  Location ICU 03/ICU  MRN [id number]  : 1965 Date 2022       Current Admission Date: 2022  Current Admission Diagnosis:Asthma with acute exacerbation   Patient Active Problem List    Diagnosis Date Noted    HIV infection ([facility] [address] ) 2022    Asthma with acute exacerbation 2022      LOS (days): 1  Geometric Mean LOS (GMLOS) (days):   Days to GMLOS:     OBJECTIVE:    Risk of Unplanned Readmission Score: 6 75     Current admission status: Inpatient    Preferred Pharmacy: No Pharmacies Listed  Primary Care Provider: No primary care provider on file  Primary Insurance: COMMERCIAL MISCELLANEOUS  Secondary Insurance:     ASSESSMENT:  Active Health Care Proxies    There are no active Health Care Proxies on file  Advance Directives  Does patient have a Health Care POA?: Yes  Does patient have Advance Directives?: Yes  Advance Directives: Living will, Power of  for health care  Primary Contact: Mother    Readmission Root Cause  30 Day Readmission: No    Patient Information  Admitted from[de-identified] Home  Mental Status: Alert  During Assessment patient was accompanied by: Not accompanied during assessment  Assessment information provided by[de-identified] Patient  Primary Caregiver: Self  Support Systems: [address] Riggins [address] of Residence: Other (specify in comment box) ([place]  What city do you live in?: [address] [place] [address] entry access options   Select all that apply : Stairs  Number of steps to enter home : 3  Do the steps have railings?: Yes  Type of Current Residence: [address] [place] [address] home  Upon entering residence, is there a bedroom on the main floor (no further steps)?: Yes  Upon entering residence, is there a bathroom on the main floor (no further steps)?: Yes  In the last 12 months, was there a time when you were not able to pay the mortgage or rent on time?: No  In the last 12 months, how many places have you lived?: 1  In the last 12 months, was there a time when you did not have a steady place to sleep or slept in a shelter (including now)?: No  Homeless/housing insecurity resource given?: N/A  Living Arrangements: Lives Alone  Is patient a ?: Yes  Is patient active with [facility])?: Yes  Is patient service connected?: Yes    Activities of Daily Living Prior to Admission  Functional Status: Independent  Completes ADLs independently?: Yes  Ambulates independently?: Yes  Does patient use assisted devices?: No  Does patient currently own DME?: Yes  What DME does the patient currently own?: Nebulizer  Does patient have a history of Outpatient Therapy (PT/OT)?: No  Does the patient have a history of Short-Term Rehab?: No  Does patient have a history of HHC?: No  Does patient currently have [facility]?: No    Patient Information Continued  Income Source: Pension/detention  Does patient have prescription coverage?: Yes  Within the past 12 months, you worried that your food would run out before you got the money to buy more : Never true  Within the past 12 months, the food you bought just didn't last and you didn't have money to get more : Never true  Food insecurity resource given?: N/A  Does patient receive dialysis treatments?: No  Does patient have a history of substance abuse?: No  Does patient have a history of Mental Health Diagnosis?: No    PHQ 2/9 Screening   Reviewed PHQ 2/9 Depression Screening Score?: No    Means of Transportation  Means of Transport to Appts[de-identified] Drives Self  In the past 12 months, has lack of transportation kept you from medical appointments or from getting medications?: No  In the past 12 months, has lack of transportation kept you from meetings, work, or from getting things needed for daily living?: No  Was application for public transport provided?: N/A  DISCHARGE DETAILS:    Discharge planning discussed with[de-identified] Nemo Smith         Is the patient interested in [facility] at discharge?: No    DME Referral Provided  Referral made for DME?: No  Would you like to participate in our [address] service program?  : No - Declined    Treatment Team Recommendation: Home  Discharge Destination Plan[de-identified] Home

## 2023-08-26 ENCOUNTER — APPOINTMENT (EMERGENCY)
Dept: RADIOLOGY | Facility: HOSPITAL | Age: 58
End: 2023-08-26
Payer: COMMERCIAL

## 2023-08-26 ENCOUNTER — HOSPITAL ENCOUNTER (OUTPATIENT)
Facility: HOSPITAL | Age: 58
Setting detail: OBSERVATION
Discharge: HOME | End: 2023-08-27
Attending: STUDENT IN AN ORGANIZED HEALTH CARE EDUCATION/TRAINING PROGRAM | Admitting: STUDENT IN AN ORGANIZED HEALTH CARE EDUCATION/TRAINING PROGRAM
Payer: COMMERCIAL

## 2023-08-26 DIAGNOSIS — R06.03 RESPIRATORY DISTRESS: ICD-10-CM

## 2023-08-26 DIAGNOSIS — J45.901 SEVERE ASTHMA WITH ACUTE EXACERBATION, UNSPECIFIED WHETHER PERSISTENT: Primary | ICD-10-CM

## 2023-08-26 LAB
ANION GAP SERPL CALCULATED.3IONS-SCNC: 7 MMOL/L
BASOPHILS # BLD MANUAL: 0.18 THOUSAND/UL (ref 0–0.1)
BASOPHILS NFR MAR MANUAL: 2 % (ref 0–1)
BUN SERPL-MCNC: 12 MG/DL (ref 5–25)
CALCIUM SERPL-MCNC: 9.9 MG/DL (ref 8.4–10.2)
CHLORIDE SERPL-SCNC: 103 MMOL/L (ref 96–108)
CO2 SERPL-SCNC: 33 MMOL/L (ref 21–32)
CREAT SERPL-MCNC: 1.1 MG/DL (ref 0.6–1.3)
EOSINOPHIL # BLD MANUAL: 0.88 THOUSAND/UL (ref 0–0.4)
EOSINOPHIL NFR BLD MANUAL: 10 % (ref 0–6)
ERYTHROCYTE [DISTWIDTH] IN BLOOD BY AUTOMATED COUNT: 13.2 % (ref 11.6–15.1)
FLUAV RNA RESP QL NAA+PROBE: NEGATIVE
FLUBV RNA RESP QL NAA+PROBE: NEGATIVE
GFR SERPL CREATININE-BSD FRML MDRD: 75 ML/MIN/1.73SQ M
GLUCOSE SERPL-MCNC: 104 MG/DL (ref 65–140)
HCT VFR BLD AUTO: 42.2 % (ref 36.5–49.3)
HGB BLD-MCNC: 15 G/DL (ref 12–17)
LYMPHOCYTES # BLD AUTO: 3.33 THOUSAND/UL (ref 0.6–4.47)
LYMPHOCYTES # BLD AUTO: 38 % (ref 14–44)
MCH RBC QN AUTO: 29.8 PG (ref 26.8–34.3)
MCHC RBC AUTO-ENTMCNC: 35.5 G/DL (ref 31.4–37.4)
MCV RBC AUTO: 84 FL (ref 82–98)
MONOCYTES # BLD AUTO: 0.61 THOUSAND/UL (ref 0–1.22)
MONOCYTES NFR BLD: 7 % (ref 4–12)
NEUTROPHILS # BLD MANUAL: 3.77 THOUSAND/UL (ref 1.85–7.62)
NEUTS SEG NFR BLD AUTO: 43 % (ref 43–75)
PLATELET # BLD AUTO: 247 THOUSANDS/UL (ref 149–390)
PLATELET BLD QL SMEAR: ADEQUATE
PMV BLD AUTO: 10.6 FL (ref 8.9–12.7)
POTASSIUM SERPL-SCNC: 4 MMOL/L (ref 3.5–5.3)
RBC # BLD AUTO: 5.04 MILLION/UL (ref 3.88–5.62)
RBC MORPH BLD: NORMAL
RSV RNA RESP QL NAA+PROBE: NEGATIVE
SARS-COV-2 RNA RESP QL NAA+PROBE: NEGATIVE
SODIUM SERPL-SCNC: 143 MMOL/L (ref 135–147)
WBC # BLD AUTO: 8.76 THOUSAND/UL (ref 4.31–10.16)

## 2023-08-26 PROCEDURE — 94644 CONT INHLJ TX 1ST HOUR: CPT

## 2023-08-26 PROCEDURE — 71045 X-RAY EXAM CHEST 1 VIEW: CPT

## 2023-08-26 PROCEDURE — 99223 1ST HOSP IP/OBS HIGH 75: CPT | Performed by: PHYSICIAN ASSISTANT

## 2023-08-26 PROCEDURE — 36415 COLL VENOUS BLD VENIPUNCTURE: CPT | Performed by: STUDENT IN AN ORGANIZED HEALTH CARE EDUCATION/TRAINING PROGRAM

## 2023-08-26 PROCEDURE — 94760 N-INVAS EAR/PLS OXIMETRY 1: CPT

## 2023-08-26 PROCEDURE — 99291 CRITICAL CARE FIRST HOUR: CPT | Performed by: STUDENT IN AN ORGANIZED HEALTH CARE EDUCATION/TRAINING PROGRAM

## 2023-08-26 PROCEDURE — 94664 DEMO&/EVAL PT USE INHALER: CPT

## 2023-08-26 PROCEDURE — 85027 COMPLETE CBC AUTOMATED: CPT | Performed by: STUDENT IN AN ORGANIZED HEALTH CARE EDUCATION/TRAINING PROGRAM

## 2023-08-26 PROCEDURE — 99285 EMERGENCY DEPT VISIT HI MDM: CPT

## 2023-08-26 PROCEDURE — 0241U HB NFCT DS VIR RESP RNA 4 TRGT: CPT | Performed by: STUDENT IN AN ORGANIZED HEALTH CARE EDUCATION/TRAINING PROGRAM

## 2023-08-26 PROCEDURE — 96365 THER/PROPH/DIAG IV INF INIT: CPT

## 2023-08-26 PROCEDURE — 96366 THER/PROPH/DIAG IV INF ADDON: CPT

## 2023-08-26 PROCEDURE — 93005 ELECTROCARDIOGRAM TRACING: CPT

## 2023-08-26 PROCEDURE — 96375 TX/PRO/DX INJ NEW DRUG ADDON: CPT

## 2023-08-26 PROCEDURE — 85007 BL SMEAR W/DIFF WBC COUNT: CPT | Performed by: STUDENT IN AN ORGANIZED HEALTH CARE EDUCATION/TRAINING PROGRAM

## 2023-08-26 PROCEDURE — 94640 AIRWAY INHALATION TREATMENT: CPT

## 2023-08-26 PROCEDURE — 80048 BASIC METABOLIC PNL TOTAL CA: CPT | Performed by: STUDENT IN AN ORGANIZED HEALTH CARE EDUCATION/TRAINING PROGRAM

## 2023-08-26 RX ORDER — METHYLPREDNISOLONE SODIUM SUCCINATE 125 MG/2ML
60 INJECTION, POWDER, LYOPHILIZED, FOR SOLUTION INTRAMUSCULAR; INTRAVENOUS ONCE
Status: COMPLETED | OUTPATIENT
Start: 2023-08-26 | End: 2023-08-26

## 2023-08-26 RX ORDER — MONTELUKAST SODIUM 10 MG/1
10 TABLET ORAL
Status: DISCONTINUED | OUTPATIENT
Start: 2023-08-27 | End: 2023-08-27 | Stop reason: HOSPADM

## 2023-08-26 RX ORDER — METHYLPREDNISOLONE SODIUM SUCCINATE 40 MG/ML
40 INJECTION, POWDER, LYOPHILIZED, FOR SOLUTION INTRAMUSCULAR; INTRAVENOUS EVERY 12 HOURS SCHEDULED
Status: DISCONTINUED | OUTPATIENT
Start: 2023-08-27 | End: 2023-08-27 | Stop reason: HOSPADM

## 2023-08-26 RX ORDER — LEVALBUTEROL INHALATION SOLUTION 1.25 MG/3ML
1.25 SOLUTION RESPIRATORY (INHALATION)
Status: DISCONTINUED | OUTPATIENT
Start: 2023-08-27 | End: 2023-08-27 | Stop reason: HOSPADM

## 2023-08-26 RX ORDER — ENOXAPARIN SODIUM 100 MG/ML
40 INJECTION SUBCUTANEOUS DAILY
Status: DISCONTINUED | OUTPATIENT
Start: 2023-08-27 | End: 2023-08-27 | Stop reason: HOSPADM

## 2023-08-26 RX ORDER — ACETAMINOPHEN 325 MG/1
650 TABLET ORAL EVERY 6 HOURS PRN
Status: DISCONTINUED | OUTPATIENT
Start: 2023-08-26 | End: 2023-08-27 | Stop reason: HOSPADM

## 2023-08-26 RX ORDER — MAGNESIUM SULFATE HEPTAHYDRATE 40 MG/ML
2 INJECTION, SOLUTION INTRAVENOUS ONCE
Status: COMPLETED | OUTPATIENT
Start: 2023-08-26 | End: 2023-08-26

## 2023-08-26 RX ORDER — SODIUM CHLORIDE FOR INHALATION 0.9 %
12 VIAL, NEBULIZER (ML) INHALATION ONCE
Status: COMPLETED | OUTPATIENT
Start: 2023-08-26 | End: 2023-08-26

## 2023-08-26 RX ORDER — SODIUM CHLORIDE FOR INHALATION 0.9 %
3 VIAL, NEBULIZER (ML) INHALATION
Status: DISCONTINUED | OUTPATIENT
Start: 2023-08-27 | End: 2023-08-27 | Stop reason: HOSPADM

## 2023-08-26 RX ORDER — ONDANSETRON 2 MG/ML
4 INJECTION INTRAMUSCULAR; INTRAVENOUS EVERY 6 HOURS PRN
Status: DISCONTINUED | OUTPATIENT
Start: 2023-08-26 | End: 2023-08-27 | Stop reason: HOSPADM

## 2023-08-26 RX ADMIN — METHYLPREDNISOLONE SODIUM SUCCINATE 60 MG: 125 INJECTION, POWDER, FOR SOLUTION INTRAMUSCULAR; INTRAVENOUS at 21:33

## 2023-08-26 RX ADMIN — ALBUTEROL SULFATE 10 MG: 2.5 SOLUTION RESPIRATORY (INHALATION) at 21:55

## 2023-08-26 RX ADMIN — IPRATROPIUM BROMIDE 1 MG: 0.5 SOLUTION RESPIRATORY (INHALATION) at 21:55

## 2023-08-26 RX ADMIN — Medication 12 ML: at 21:56

## 2023-08-26 RX ADMIN — MAGNESIUM SULFATE HEPTAHYDRATE 2 G: 40 INJECTION, SOLUTION INTRAVENOUS at 21:33

## 2023-08-26 NOTE — Clinical Note
Date: 8/26/2023  Patient: Martha Blair  Admitted: 8/26/2023  9:19 PM  Attending Provider: Willi Morrissey MD    Martha Blair or his authorized caregiver has made the decision for the patient to leave the emergency department against the advice o f his attending physician. He or his authorized caregiver has been informed and understands the inherent risks, including death. He or his authorized caregiver has decided to accept the responsibility for this decision. Martha Blair and all [facility] parties have been advised that he may return for further evaluation or treatment.  His condition at time of discharge was critical.  Martha Blair had current vital signs as follows:  /83 (BP Location: Left arm)   Pulse 94   Temp 97.6 ° F (36.4 °C) (Oral)   Resp 18

## 2023-08-27 VITALS
RESPIRATION RATE: 16 BRPM | TEMPERATURE: 98 F | HEIGHT: 68 IN | SYSTOLIC BLOOD PRESSURE: 138 MMHG | HEART RATE: 97 BPM | BODY MASS INDEX: 19.95 KG/M2 | DIASTOLIC BLOOD PRESSURE: 85 MMHG | OXYGEN SATURATION: 98 % | WEIGHT: 131.61 LBS

## 2023-08-27 LAB
ATRIAL RATE: 102 BPM
P AXIS: 84 DEGREES
PR INTERVAL: 178 MS
QRS AXIS: 91 DEGREES
QRSD INTERVAL: 94 MS
QT INTERVAL: 354 MS
QTC INTERVAL: 461 MS
T WAVE AXIS: 80 DEGREES
VENTRICULAR RATE: 102 BPM

## 2023-08-27 PROCEDURE — 94760 N-INVAS EAR/PLS OXIMETRY 1: CPT

## 2023-08-27 PROCEDURE — 94640 AIRWAY INHALATION TREATMENT: CPT

## 2023-08-27 PROCEDURE — 99239 HOSP IP/OBS DSCHRG MGMT >30: CPT | Performed by: PHYSICIAN ASSISTANT

## 2023-08-27 PROCEDURE — 93010 ELECTROCARDIOGRAM REPORT: CPT | Performed by: INTERNAL MEDICINE

## 2023-08-27 RX ORDER — PREDNISONE 10 MG/1
TABLET ORAL
Qty: 30 TABLET | Refills: 0 | Status: SHIPPED | OUTPATIENT
Start: 2023-08-27 | End: 2023-09-08

## 2023-08-27 RX ORDER — ALBUTEROL SULFATE 90 UG/1
2 AEROSOL, METERED RESPIRATORY (INHALATION) EVERY 6 HOURS PRN
Status: ON HOLD | COMMUNITY
End: 2023-08-27 | Stop reason: SDUPTHER

## 2023-08-27 RX ORDER — MONTELUKAST SODIUM 10 MG/1
10 TABLET ORAL
Qty: 30 TABLET | Refills: 0 | Status: SHIPPED | OUTPATIENT
Start: 2023-08-27

## 2023-08-27 RX ORDER — ALBUTEROL SULFATE 90 UG/1
2 AEROSOL, METERED RESPIRATORY (INHALATION) EVERY 6 HOURS PRN
Qty: 18 G | Refills: 0 | Status: SHIPPED | OUTPATIENT
Start: 2023-08-27

## 2023-08-27 RX ADMIN — ENOXAPARIN SODIUM 40 MG: 40 INJECTION SUBCUTANEOUS at 08:53

## 2023-08-27 RX ADMIN — METHYLPREDNISOLONE SODIUM SUCCINATE 40 MG: 40 INJECTION, POWDER, FOR SOLUTION INTRAMUSCULAR; INTRAVENOUS at 05:55

## 2023-08-27 RX ADMIN — MONTELUKAST 10 MG: 10 TABLET, FILM COATED ORAL at 01:33

## 2023-08-27 RX ADMIN — DOLUTEGRAVIR SODIUM 50 MG: 50 TABLET, FILM COATED ORAL at 08:45

## 2023-08-27 RX ADMIN — RILPIVIRINE HYDROCHLORIDE 25 MG: 25 TABLET, FILM COATED ORAL at 08:45

## 2023-08-27 RX ADMIN — ISODIUM CHLORIDE 3 ML: 0.03 SOLUTION RESPIRATORY (INHALATION) at 08:01

## 2023-08-27 RX ADMIN — LEVALBUTEROL HYDROCHLORIDE 1.25 MG: 1.25 SOLUTION RESPIRATORY (INHALATION) at 08:01

## 2023-08-27 NOTE — DISCHARGE INSTRUCTIONS
Take steroids as prescribed  Follow up with your pulmonologist  Discuss that your eosinophil count gautam with this exacerbation and you can consider allergy testing to see if your a candidate for a medication such as dupixent to help prevent asthma flares     It was a pleasure taking part in your care,    Leslie Gutiérrez PA-C

## 2023-08-27 NOTE — DISCHARGE SUMMARY
[id number] [facility]  Discharge- Noni Mcginnis 1965, 56 y.o. male MRN: [id number]  Unit/Bed#: -01 Encounter: [id number]  Primary Care Provider: No primary care provider on file. Date and time admitted to hospital: 8/26/2023  9:19 PM    HIV infection ([address])  Assessment & Plan  · Continue home medication Dolutegravir/Rilpivirine 50 mg/25 mg    * Asthma with acute exacerbation  Assessment & Plan  He notes 2 days progressively worsening shortness of breath and wheezing. He has had prior admission to the ICU regarding his asthma and required BiPAP. In the ED he was given HAART neb, Solu-Medrol 60 mg and magnesium. · Asthma pathway  · Supportive care  · Respiratory protocol  · Eosinophils significantly elevated, given he reports his exacerbation occur when allergies are significant, recommend allergy testing and evaluation for eosinophillic asthma   · Steroid taper on discharge, initiated on singulair      Medical Problems     Resolved Problems  Date Reviewed: 8/26/2023   None       Discharging Physician / Practitioner: Yudith Piña PA-C  PCP: No primary care provider on file. Admission Date:   Admission Orders (From admission, onward)     Ordered        08/26/23 2327  Place in Observation  Once                      Discharge Date: 08/27/23    Consultations During Hospital Stay:  · none    Procedures Performed:   · none    Significant Findings / Test Results:     X-ray chest 1 view portable    (Results Pending)     Per my read, no consolidation or signs of vascular congestion. Consistencies with severe asthma with enlarged lung fields    Incidental Findings:   · none       Test Results Pending at Discharge (will require follow up):   · none     Outpatient Tests Requested:  · none    Complications:  none    Reason for Admission: asthma exacerbation    Hospital Course:   Noni Mcginnis is a 56 y.o. male patient who originally presented to the hospital on 8/26/2023 due to feelings of SOB. He had been on a camping trip this weekend and noted that his breathing felt tighter. He unfortunately had not had his inhalers with him on his trip. He eventually felt that he should come to the hospital for evaluation. He received IV steroids and breathing treatment. He felt improved at that time but was still experiencing wheezing and was recommended for admission. At this time, his wheezing is resolved and he feels back to baseline. He will be discharged with a steroid taper. The patient, initially admitted to the hospital as inpatient, was discharged earlier than expected given the following: improvement in symptoms. Please see above list of diagnoses and related plan for additional information. Condition at Discharge: good    Discharge Day Visit / Exam:   Subjective:  Patient reports feeling significantly improved today and his breathing is near his baseline. He wishes to go home today  Vitals: Blood Pressure: 138/85 (08/27/23 0750)  Pulse: 97 (08/27/23 0750)  Temperature: 98 °F (36.7 °C) (08/27/23 0750)  Temp Source: Oral (08/26/23 2124)  Respirations: 16 (08/27/23 0750)  Height: 5' 8" (172.7 cm) (08/26/23 2355)  Weight - Scale: 59.7 kg (131 lb 9.8 oz) (08/26/23 2349)  SpO2: 98 % (08/27/23 0750)  Exam:   Physical Exam  Vitals and nursing note reviewed. Constitutional:       General: He is not in acute distress. HENT:      Head: Normocephalic and atraumatic. Cardiovascular:      Rate and Rhythm: Normal rate and regular rhythm. Pulses: Normal pulses. Heart sounds: Normal heart sounds. Pulmonary:      Effort: Pulmonary effort is normal.      Breath sounds: Normal breath sounds. No wheezing. Abdominal:      General: Bowel sounds are normal.      Palpations: Abdomen is soft. Tenderness: There is no abdominal tenderness. There is no guarding. Skin:     General: Skin is warm and dry. Neurological:      Mental Status: He is alert. Mental status is at baseline.    Psychiatric: Mood and Affect: Mood normal.         Behavior: Behavior normal.          Discussion with Family: Patient declined call to . Discharge instructions/Information to patient and family:   See after visit summary for information provided to patient and family. Provisions for Follow-Up Care:  See after visit summary for information related to follow-up care and any pertinent home health orders. Disposition:   Home    Planned Readmission: none     Discharge Statement:  I spent 60 minutes discharging the patient. This time was spent on the day of discharge. I had direct contact with the patient on the day of discharge. Greater than 50% of the total time was spent examining patient, answering all patient questions, arranging and discussing plan of care with patient as well as directly providing post-discharge instructions. Additional time then spent on discharge activities. Discharge Medications:  See after visit summary for reconciled discharge medications provided to patient and/or family.       **Please Note: This note may have been constructed using a voice recognition system**

## 2023-08-27 NOTE — NURSING NOTE
Pt refused morning labs, stating 'they just did that downstairs'. Wants an explanation as to why it is necessary again so soon. Will make day shift aware.

## 2023-08-27 NOTE — RESPIRATORY THERAPY NOTE
RT Protocol Note  Nick Siddiqi 56 y.o. male MRN: [id number]  Unit/Bed#: -01 Encounter: [id number]    Assessment    Principal Problem:    Asthma with acute exacerbation  Active Problems:    HIV infection ([address] [facility])      Home Pulmonary Medications:    Home Devices/Therapy: Other (Comment) (alb MDI prn and neb alb prn no other resp therapy)    Past Medical History:   Diagnosis Date    Asthma     HIV (human immunodeficiency virus infection) ([address] [facility])      Social History     Socioeconomic History    Marital status: Single     Spouse name: None    Number of children: None    Years of education: None    Highest education level: None   Occupational History    None   Tobacco Use    Smoking status: Never    Smokeless tobacco: None   Vaping Use    Vaping Use: Never used   Substance and Sexual Activity    Alcohol use: Yes     Comment: Socially    Drug use: Never    Sexual activity: None   Other Topics Concern    None   Social History Narrative    None     Social Determinants of Health     Financial Resource Strain: Not on file   Food Insecurity: No Food Insecurity (9/17/2022)    Hunger Vital Sign     Worried About Running Out of Food in the Last Year: Never true     Ran Out of Food in the Last Year: Never true   Transportation Needs: No Transportation Needs (9/17/2022)    PRAPARE - Transportation     Lack of Transportation (Medical): No     Lack of Transportation (Non-Medical):  No   Physical Activity: Not on file   Stress: Not on file   Social Connections: Not on file   Intimate Partner Violence: Not on file   Housing Stability: Low Risk  (9/17/2022)    Housing Stability Vital Sign     Unable to Pay for Housing in the Last Year: No     Number of Places Lived in the Last Year: 1     Unstable Housing in the Last Year: No       Subjective         Objective    Physical Exam:   Assessment Type: Post-treatment  General Appearance: Alert, Awake  Respiratory Pattern: Normal  Chest Assessment: Chest expansion symmetrical  Bilateral Breath Sounds: Expiratory wheezes (distant faint)  Cough: Strong, Dry, Non-productive  O2 Device: ra    Vitals:  Blood pressure 117/84, pulse 94, temperature 97.6 °F (36.4 °C), resp. rate 17, weight 59.7 kg (131 lb 9.8 oz), SpO2 96 %. Imaging and other studies: I have personally reviewed pertinent reports.       O2 Device: ra     Plan    Respiratory Plan: Home Bronchodilator Patient pathway, No distress/Pulmonary history        Resp Comments: pt reamins on room air was moved to 3 rd floor no Peak flow preformed prior rn did not place in bag, pt remains in no resp distress able to have easy conversations, pt has asthma hx, HIV, never a smoker, [place] alb neb and only MDI alb to [facility] where something triggered his SOB, pt was given montanez neb in ER and at present remains in no resp distress, will cont XOP TID and pt is aware will start in am, BS post cont neb increased aeration with distant faint exp wheezing unchanged

## 2023-08-27 NOTE — ASSESSMENT & PLAN NOTE
He notes 2 days progressively worsening shortness of breath and wheezing. He has had prior admission to the ICU regarding his asthma and required BiPAP. In the ED he was given HAART neb, Solu-Medrol 60 mg and magnesium.   · Asthma pathway  · Supportive care  · Respiratory protocol  · Eosinophils significantly elevated, given he reports his exacerbation occur when allergies are significant, recommend allergy testing and evaluation for eosinophillic asthma   · Steroid taper on discharge, initiated on singulair

## 2023-08-27 NOTE — ASSESSMENT & PLAN NOTE
He notes 2 days progressively worsening shortness of breath and wheezing. He has had prior admission to the ICU regarding his asthma and required BiPAP. In the ED he was given HAART neb, Solu-Medrol 60 mg and magnesium.   · Asthma pathway  · Supportive care  · Respiratory protocol

## 2023-08-27 NOTE — H&P
[address]  H&P  Name: Henrietta Meredith 56 y.o. male I MRN: [id number]  Unit/Bed#: -01 I Date of Admission: 8/26/2023   Date of Service: 8/27/2023 I Hospital Day: 0      Assessment/Plan   * Asthma with acute exacerbation  Assessment & Plan  He notes 2 days progressively worsening shortness of breath and wheezing. He has had prior admission to the ICU regarding his asthma and required BiPAP. In the ED he was given HAART neb, Solu-Medrol 60 mg and magnesium. · Asthma pathway  · Supportive care  · Respiratory protocol    HIV infection ([address])  Assessment & Plan  · Continue home medication Dolutegravir/Rilpivirine 50 mg/25 mg    VTE Pharmacologic Prophylaxis: VTE Score: 3 Moderate Risk (Score 3-4) - Pharmacological DVT Prophylaxis Ordered: enoxaparin (Lovenox). Code Status: Full code  Discussion with patient    Anticipated Length of Stay: Patient will be admitted on an observation basis with an anticipated length of stay of less than 2 midnights secondary to Respiratory monitoring. Total Time Spent on Date of Encounter in care of patient: 75 minutes This time was spent on one or more of the following: performing physical exam; counseling and coordination of care; obtaining or reviewing history; documenting in the medical record; reviewing/ordering tests, medications or procedures; communicating with other healthcare professionals and discussing with patient's family/caregivers. Chief Complaint: Shortness of breath and wheezing    History of Present Illness:  Henrietta Meredith is a 56 y.o. male with a PMH of asthma and HIV who presents with shortness of breath and wheezing. Patient reports trouble breathing started Friday, tightness of chest, SOB, wheezing, coughing. Tried using albuterol with no improvement. In ED he was given MCKEON neb, solumedrol and magnesium and reports breathing feels a little better. Had admission with BiPAP, no intubation.      Review of Systems:  Review of Systems   Constitutional: Negative for chills and fever. Respiratory: Positive for cough, chest tightness, shortness of breath and wheezing. All other systems reviewed and are negative. Past Medical and Surgical History:   Past Medical History:   Diagnosis Date   • Asthma    • HIV (human immunodeficiency virus infection) ([address])        History reviewed. No pertinent surgical history. Meds/Allergies:  Prior to Admission medications    Not on File     I have reviewed home medications with patient personally. Allergies: No Known Allergies    Social History:  Marital Status: Single   Occupation: retired  Patient Pre-hospital Living Situation: Home  Patient Pre-hospital Level of Mobility: walks  Patient Pre-hospital Diet Restrictions: None  Substance Use History:   Social History     Substance and Sexual Activity   Alcohol Use Yes    Comment: Socially     Social History     Tobacco Use   Smoking Status Never   Smokeless Tobacco Never     Social History     Substance and Sexual Activity   Drug Use Never       Family History:  History reviewed. No pertinent family history. Physical Exam:     Vitals:   Blood Pressure: 117/84 (08/26/23 2355)  Pulse: 94 (08/26/23 2355)  Temperature: 97.6 °F (36.4 °C) (08/26/23 2355)  Temp Source: Oral (08/26/23 2124)  Respirations: 17 (08/26/23 2355)  Weight - Scale: 59.7 kg (131 lb 9.8 oz) (08/26/23 2349)  SpO2: 96 % (08/26/23 2355)    Physical Exam  Vitals reviewed. Constitutional:       General: He is not in acute distress. Appearance: Normal appearance. Comments: Sleeping, arouseable   HENT:      Head: Normocephalic and atraumatic. Right Ear: External ear normal.      Left Ear: External ear normal.      Nose: Nose normal.   Eyes:      General:         Right eye: No discharge. Left eye: No discharge. Conjunctiva/sclera: Conjunctivae normal.   Cardiovascular:      Rate and Rhythm: Normal rate and regular rhythm.       Heart sounds: Normal heart sounds. No murmur heard. Pulmonary:      Effort: Pulmonary effort is normal. No respiratory distress. Breath sounds: Wheezing present. No rales. Abdominal:      General: Bowel sounds are normal. There is no distension. Palpations: Abdomen is soft. Tenderness: There is no abdominal tenderness. There is no guarding. Musculoskeletal:         General: Normal range of motion. Cervical back: Normal range of motion. Skin:     General: Skin is warm and dry. Neurological:      Mental Status: He is oriented to person, place, and time. Mental status is at baseline. Psychiatric:         Mood and Affect: Mood normal.         Behavior: Behavior normal.         Thought Content: Thought content normal.         Judgment: Judgment normal.        Additional Data:     Lab Results:  Results from last 7 days   Lab Units 08/26/23  2132   WBC Thousand/uL 8.76   HEMOGLOBIN g/dL 15.0   HEMATOCRIT % 42.2   PLATELETS Thousands/uL 247   LYMPHO PCT % 38   MONO PCT % 7   EOS PCT % 10*     Results from last 7 days   Lab Units 08/26/23  2132   SODIUM mmol/L 143   POTASSIUM mmol/L 4.0   CHLORIDE mmol/L 103   CO2 mmol/L 33*   BUN mg/dL 12   CREATININE mg/dL 1.10   ANION GAP mmol/L 7   CALCIUM mg/dL 9.9   GLUCOSE RANDOM mg/dL 104       Lines/Drains:  Invasive Devices     Peripheral Intravenous Line  Duration           Peripheral IV 08/26/23 Distal;Left;Upper;Ventral (anterior) Arm <1 day              Imaging: No acute disease my read, formal read pending  X-ray chest 1 view portable    (Results Pending)       EKG and Other Studies Reviewed on Admission:   · EKG: Sinus Tachycardia. , reviewed in muse personally. ** Please Note: This note has been constructed using a voice recognition system.  **

## 2023-08-27 NOTE — NURSING NOTE
Pt admitted to room 317 from ER. Alert and oriented, pleasant and cooperative, heart is regular, no edema noted. Lungs are decreased with exp wheeze present. Denies cough, denies pain. Currently sleeping with no s/s of pain or distress. Chava monitor in place and active. Respirations easy and non labored. Call bell and personal belongings within reach.

## 2023-08-27 NOTE — PLAN OF CARE
Problem: PAIN - ADULT  Goal: Verbalizes/displays adequate comfort level or baseline comfort level  Description: Interventions:  - Encourage patient to monitor pain and request assistance  - Assess pain using appropriate pain scale  - Administer analgesics based on type and severity of pain and evaluate response  - Implement non-pharmacological measures as appropriate and evaluate response  - Consider cultural and social influences on pain and pain management  - Notify physician/advanced practitioner if interventions unsuccessful or patient reports new pain  Outcome: Progressing     Problem: INFECTION - ADULT  Goal: Absence or prevention of progression during hospitalization  Description: INTERVENTIONS:  - Assess and monitor for signs and symptoms of infection  - Monitor lab/diagnostic results  - Monitor all insertion sites, i.e. indwelling lines, tubes, and drains  - Monitor endotracheal if appropriate and nasal secretions for changes in amount and color  - Lottie appropriate cooling/warming therapies per order  - Administer medications as ordered  - Instruct and encourage patient and family to use good hand hygiene technique  - Identify and instruct in appropriate isolation precautions for identified infection/condition  Outcome: Progressing  Goal: Absence of fever/infection during neutropenic period  Description: INTERVENTIONS:  - Monitor WBC    Outcome: Progressing     Problem: SAFETY ADULT  Goal: Patient will remain free of falls  Description: INTERVENTIONS:  - Educate patient/family on patient safety including physical limitations  - Instruct patient to call for assistance with activity   - Consult OT/PT to assist with strengthening/mobility   - Keep Call bell within reach  - Keep bed low and locked with side rails adjusted as appropriate  - Keep care items and personal belongings within reach  - Initiate and maintain comfort rounds  - Make Fall Risk Sign visible to staff  - Offer Toileting every 2 Hours, in advance of need  - Initiate/Maintain bed alarm  - Obtain necessary fall risk management equipment:   - Apply yellow socks and bracelet for high fall risk patients  - Consider moving patient to room near nurses station  Outcome: Progressing  Goal: Maintain or return to baseline ADL function  Description: INTERVENTIONS:  -  Assess patient's ability to carry out ADLs; assess patient's baseline for ADL function and identify physical deficits which impact ability to perform ADLs (bathing, care of mouth/teeth, toileting, grooming, dressing, etc.)  - Assess/evaluate cause of self-care deficits   - Assess range of motion  - Assess patient's mobility; develop plan if impaired  - Assess patient's need for assistive devices and provide as appropriate  - Encourage maximum independence but intervene and supervise when necessary  - Involve family in performance of ADLs  - Assess for home care needs following discharge   - Consider OT consult to assist with ADL evaluation and planning for discharge  - Provide patient education as appropriate  Outcome: Progressing  Goal: Maintains/Returns to pre admission functional level  Description: INTERVENTIONS:  - Perform BMAT or MOVE assessment daily.   - Set and communicate daily mobility goal to care team and patient/family/caregiver. - Collaborate with rehabilitation services on mobility goals if consulted  - Perform Range of Motion 2 times a day. - Reposition patient every 2 hours.   - Dangle patient 2 times a day  - Stand patient 2 times a day  - Ambulate patient 2 times a day  - Out of bed to chair 2 times a day   - Out of bed for meals 2 times a day  - Out of bed for toileting  - Record patient progress and toleration of activity level   Outcome: Progressing     Problem: DISCHARGE PLANNING  Goal: Discharge to home or other facility with appropriate resources  Description: INTERVENTIONS:  - Identify barriers to discharge w/patient and caregiver  - Arrange for needed discharge resources and transportation as appropriate  - Identify discharge learning needs (meds, wound care, etc.)  - Arrange for interpretive services to assist at discharge as needed  - Refer to Case Management Department for coordinating discharge planning if the patient needs post-hospital services based on physician/advanced practitioner order or complex needs related to functional status, cognitive ability, or social support system  Outcome: Progressing     Problem: Knowledge Deficit  Goal: Patient/family/caregiver demonstrates understanding of disease process, treatment plan, medications, and discharge instructions  Description: Complete learning assessment and assess knowledge base.   Interventions:  - Provide teaching at level of understanding  - Provide teaching via preferred learning methods  Outcome: Progressing

## 2023-08-27 NOTE — NURSING NOTE
Pt denies pain, sob/wheezing. Continues on room air. Awaiting provider, pt requesting to be able to go home.

## 2023-08-27 NOTE — NURSING NOTE
Pt dc home avs read and explained to pt all questions answered. IV removed without complications and tip intact. All belongings with pt. Pt escorted to front entrance and assisted into car of friend.

## 2023-08-27 NOTE — ED PROVIDER NOTES
History  Chief Complaint   Patient presents with   • Asthma     Was out on walk in the woods without his albuterol inhaler; Also takes steroids; started Friday morning   • Shortness of Breath     ANNA is a 53-year-old male who presents to the emergency department with approximately 2 days of progressively worsening shortness of breath, wheezing and concerns for asthma exacerbation. Patient has longstanding history of asthma exacerbation and was previously hospitalized last November and was placed on BiPAP/stepdown secondary to severe respiratory distress. He states his symptoms started yesterday are consistent with previous asthma attacks. He states he was out in the woods walking when he started to feel short of breath unfortunately did not have his asthma inhaler with him at that time and states when he got home he then started utilizing his MDIs however notes that there is no significant alleviation of symptoms. Unfortunately he does not have a nebulizer and states the shortness of breath that progressively worse he now feels extremely short of breath and dyspneic on exertion and hence prompting him to come to the emergency department tonight. None       Past Medical History:   Diagnosis Date   • Asthma    • HIV (human immunodeficiency virus infection) ([address])        History reviewed. No pertinent surgical history. History reviewed. No pertinent family history. I have reviewed and agree with the history as documented. E-Cigarette/Vaping   • E-Cigarette Use Never User      E-Cigarette/Vaping Substances   • Nicotine No    • THC No    • CBD No    • Flavoring No    • Other No    • Unknown No      Social History     Tobacco Use   • Smoking status: Never   Vaping Use   • Vaping Use: Never used   Substance Use Topics   • Alcohol use: Yes     Comment: Socially   • Drug use: Never       Review of Systems   Constitutional: Positive for fatigue. Negative for activity change, appetite change, chills and fever. HENT: Negative for congestion, dental problem, drooling, ear discharge, ear pain, facial swelling, postnasal drip, rhinorrhea and sinus pain. Eyes: Negative for photophobia, pain, discharge and itching. Respiratory: Positive for cough and shortness of breath. Negative for apnea and chest tightness. Cardiovascular: Negative for chest pain and leg swelling. Gastrointestinal: Negative for abdominal distention, abdominal pain, anal bleeding, constipation, diarrhea and nausea. Endocrine: Negative for cold intolerance, heat intolerance and polydipsia. Genitourinary: Negative for difficulty urinating. Musculoskeletal: Negative for arthralgias, gait problem, joint swelling and myalgias. Skin: Negative for color change and pallor. Allergic/Immunologic: Negative for immunocompromised state. Neurological: Negative for dizziness, seizures, facial asymmetry, weakness, light-headedness, numbness and headaches. Psychiatric/Behavioral: Negative for agitation, behavioral problems, confusion, decreased concentration and dysphoric mood. All other systems reviewed and are negative. Physical Exam  Physical Exam  Constitutional:       General: He is in acute distress. Appearance: He is well-developed. He is ill-appearing. Comments: Acute distress secondary to respiratory distress. Patient in tripod position. SPO2 88-90 on room air. HENT:      Head: Normocephalic. Eyes:      Pupils: Pupils are equal, round, and reactive to light. Cardiovascular:      Rate and Rhythm: Normal rate and regular rhythm. Pulmonary:      Effort: Pulmonary effort is normal.      Breath sounds: Examination of the right-upper field reveals decreased breath sounds and wheezing. Examination of the left-upper field reveals decreased breath sounds and wheezing. Examination of the right-middle field reveals decreased breath sounds and wheezing.  Examination of the left-middle field reveals decreased breath sounds and wheezing. Examination of the right-lower field reveals decreased breath sounds. Examination of the left-lower field reveals decreased breath sounds. Decreased breath sounds and wheezing present. No rhonchi or rales. Abdominal:      General: Bowel sounds are normal.      Palpations: Abdomen is soft. Musculoskeletal:         General: Normal range of motion. Cervical back: Normal range of motion and neck supple. Skin:     General: Skin is warm. Capillary Refill: Capillary refill takes less than 2 seconds. Neurological:      General: No focal deficit present. Mental Status: He is alert.    Psychiatric:         Mood and Affect: Mood normal.         Vital Signs  ED Triage Vitals [08/26/23 2124]   Temperature Pulse Respirations Blood Pressure SpO2   97.6 °F (36.4 °C) (!) 112 20 154/83 94 %      Temp Source Heart Rate Source Patient Position - Orthostatic VS BP Location FiO2 (%)   Oral Monitor Sitting Left arm --      Pain Score       No Pain           Vitals:    08/26/23 2124 08/26/23 2156   BP: 154/83    Pulse: (!) 112 94   Patient Position - Orthostatic VS: Sitting          Visual Acuity      ED Medications  Medications   albuterol inhalation solution 10 mg (10 mg Nebulization Given 8/26/23 2155)   ipratropium (ATROVENT) 0.02 % inhalation solution 1 mg (1 mg Nebulization Given 8/26/23 2155)   sodium chloride 0.9 % inhalation solution 12 mL (12 mL Nebulization Given 8/26/23 2156)   methylPREDNISolone sodium succinate (Solu-MEDROL) injection 60 mg (60 mg Intravenous Given 8/26/23 2133)   magnesium sulfate 2 g/50 mL IVPB (premix) 2 g (0 g Intravenous Stopped 8/26/23 2325)       Diagnostic Studies  Results Reviewed     Procedure Component Value Units Date/Time    RBC Morphology Reflex Test [[id number]] Collected: 08/26/23 2132    Lab Status: Final result Specimen: Blood from Arm, Left Updated: 08/26/23 2301    FLU/RSV/COVID - if FLU/RSV clinically relevant [[id number]]  (Normal) Collected: 08/26/23 2132 Lab Status: Final result Specimen: Nares from Nasopharyngeal Swab Updated: 08/26/23 2222     SARS-CoV-2 Negative     INFLUENZA A PCR Negative     INFLUENZA B PCR Negative     RSV PCR Negative    Narrative:      FOR PEDIATRIC PATIENTS - copy/paste COVID Guidelines URL to browser: [email or web address]    SARS-CoV-2 assay is a Nucleic Acid Amplification assay intended for the  qualitative detection of nucleic acid from SARS-CoV-2 in nasopharyngeal  swabs. Results are for the presumptive identification of SARS-CoV-2 RNA. Positive results are indicative of infection with SARS-CoV-2, the virus  causing COVID-19, but do not rule out bacterial infection or co-infection  with other viruses. Laboratories within the [facility] and its  territories are required to report all positive results to the appropriate  public health authorities. Negative results do not preclude SARS-CoV-2  infection and should not be used as the sole basis for treatment or other  patient management decisions. Negative results must be combined with  clinical observations, patient history, and epidemiological information. This test has not been FDA cleared or approved. This test has been authorized by FDA under an Emergency Use Authorization  (EUA). This test is only authorized for the duration of time the  declaration that circumstances exist justifying the authorization of the  emergency use of an in vitro diagnostic tests for detection of SARS-CoV-2  virus and/or diagnosis of COVID-19 infection under section 564(b)(1) of  the Act, 21 U. S.C. 184AMR-4(Z)(3), unless the authorization is terminated  or revoked sooner. The test has been validated but independent review by FDA  and CLIA is pending. Test performed using [employer]: This RT-PCR assay targets N2,  a region unique to SARS-CoV-2.  A conserved region in the E-gene was chosen  for pan-Sarbecovirus detection which includes SARS-CoV-2. According to CMS-2020-01-R, this platform meets the definition of high-throughput technology. CBC and differential [[id number]]  (Normal) Collected: 08/26/23 2132    Lab Status: Final result Specimen: Blood from Arm, Left Updated: 08/26/23 2221     WBC 8.76 Thousand/uL      RBC 5.04 Million/uL      Hemoglobin 15.0 g/dL      Hematocrit 42.2 %      MCV 84 fL      MCH 29.8 pg      MCHC 35.5 g/dL      RDW 13.2 %      MPV 10.6 fL      Platelets 989 Thousands/uL     Narrative: This is an appended report. These results have been appended to a previously verified report.     Manual Differential(PHLEBS Do Not Order) [[id number]]  (Abnormal) Collected: 08/26/23 2132    Lab Status: Final result Specimen: Blood from Arm, Left Updated: 08/26/23 2221     Segmented % 43 %      Lymphocytes % 38 %      Monocytes % 7 %      Eosinophils, % 10 %      Basophils % 2 %      Absolute Neutrophils 3.77 Thousand/uL      Lymphocytes Absolute 3.33 Thousand/uL      Monocytes Absolute 0.61 Thousand/uL      Eosinophils Absolute 0.88 Thousand/uL      Basophils Absolute 0.18 Thousand/uL      Total Counted --     RBC Morphology Normal     Platelet Estimate Adequate    Basic metabolic panel [[id number]]  (Abnormal) Collected: 08/26/23 2132    Lab Status: Final result Specimen: Blood from Arm, Left Updated: 08/26/23 2205     Sodium 143 mmol/L      Potassium 4.0 mmol/L      Chloride 103 mmol/L      CO2 33 mmol/L      ANION GAP 7 mmol/L      BUN 12 mg/dL      Creatinine 1.10 mg/dL      Glucose 104 mg/dL      Calcium 9.9 mg/dL      eGFR 75 ml/min/1.73sq m     Narrative:      [place] guidelines for Chronic Kidney Disease (CKD):   •  Stage 1 with normal or high GFR (GFR > 90 mL/min/1.73 square meters)  •  Stage 2 Mild CKD (GFR = 60-89 mL/min/1.73 square meters)  •  Stage 3A Moderate CKD (GFR = 45-59 mL/min/1.73 square meters)  •  Stage 3B Moderate CKD (GFR = 30-44 mL/min/1.73 square meters)  •  Stage 4 Severe CKD (GFR = 15-29 mL/min/1.73 square meters)  •  Stage 5 End Stage CKD (GFR <15 mL/min/1.73 square meters)  Note: GFR calculation is accurate only with a steady state creatinine                 X-ray chest 1 view portable    (Results Pending)              Procedures  ECG 12 Lead Documentation Only    Date/Time: 8/26/2023 9:45 PM    Performed by: Jo Michelle MD  Authorized by: Jo Michelle MD    Indications / Diagnosis:  Shortness of breath tachycardia  ECG reviewed by me, the ED Provider: yes    Patient location:  ED  Previous ECG:     Previous ECG:  Unavailable    Comparison to cardiac monitor: Yes    Interpretation:     Interpretation: abnormal    Rate:     ECG rate assessment: tachycardic    Rhythm:     Rhythm: sinus tachycardia    Ectopy:     Ectopy: none      Ectopy comment:  Biphasic P wave  QRS:     QRS axis:  Normal  Conduction:     Conduction: normal    ST segments:     ST segments:  Normal  T waves:     T waves: normal      CriticalCare Time    Date/Time: 8/26/2023 11:01 PM    Performed by: Jo Michelle MD  Authorized by: Jo Michelle MD    Critical care provider statement:     Critical care time (minutes):  45    Critical care time was exclusive of:  Teaching time and separately billable procedures and treating other patients    Critical care was necessary to treat or prevent imminent or life-threatening deterioration of the following conditions:  Respiratory failure    Critical care was time spent personally by me on the following activities:  Blood draw for specimens, obtaining history from patient or surrogate, development of treatment plan with patient or surrogate, discussions with consultants, evaluation of patient's response to treatment, examination of patient, ordering and performing treatments and interventions, ordering and review of laboratory studies, ordering and review of radiographic studies, re-evaluation of patient's condition and review of old charts    I assumed direction of critical care for this patient from another provider in my specialty: no               ED Course  ED Course as of 08/26/23 2327   Sat Aug 26, 2023   2148 WBC: 8.76   2216 Chest x-ray as interpreted by myself shows no acute intrathoracic abnormalities there is hyperinflation but no visible infiltrate. 2226 Patient reexamined heart neb in process still significant inspiratory and expiratory wheezes. 2227 SARS-COV-2: Negative   2227 INFLU A PCR: Negative   2227 INFLU B PCR: Negative   2227 RSV PCR: Negative   2258 Patient reexamined nearing the end of heart neb still having severe expiratory inspiratory wheezing. At this point in course I discussed with patient my recommendations for admission given he has not had any clinical improvement following heart nebulizers/steroids, mag sulfate. Patient states he would like to go home, despite my recommendations. Patient is alert and oriented x4 and capable of making his own decisions. I was again strongly recommended patient be admitted I discussed risk of going home including worsening respiratory status respiratory distress ultimately respiratory arrest leading to death. Patient acknowledged    After additional conversation patient is now agreeable to admission. [address] [place] text to hospitalist to discuss admission                                             Medical Decision Making  56-year-old male with history of moderate-severe intermittent asthma presents to the emergency department with 2 days of progressively worsening shortness of breath, wheezing alleviated with home albuterol MDIs. Physical exam patient is in acute distress secondary to respiratory failure actively wheezing with decreased breath sounds bilaterally upper and lower lung fields. His SPO2 is decreased, 88-90 on room air. Plan to manage patient's respiratory failure with haart nebulizer, steroids, magnesium.   Low threshold to initiate BiPAP given history also concerns for possible need for endotracheal intubation. Patient's symptoms history most consistent with acute asthma exacerbation low suspicion for ACS given patient's physical exam findings however we will proceed with EKG nonetheless. Basic lab work and x-rays ordered to rule out underlying infection. Disposition pending response to treatment. Respiratory distress: acute illness or injury that poses a threat to life or bodily functions  Severe asthma with acute exacerbation, unspecified whether persistent: acute illness or injury that poses a threat to life or bodily functions  Amount and/or Complexity of Data Reviewed  Independent Historian: friend  External Data Reviewed: labs, radiology, ECG and notes. Labs: ordered. Decision-making details documented in ED Course. Radiology: ordered and independent interpretation performed. Decision-making details documented in ED Course. ECG/medicine tests: ordered and independent interpretation performed. Decision-making details documented in ED Course. Risk  Prescription drug management. Decision regarding hospitalization.           Disposition  Final diagnoses:   Severe asthma with acute exacerbation, unspecified whether persistent   Respiratory distress     Time reflects when diagnosis was documented in both MDM as applicable and the Disposition within this note     Time User Action Codes Description Comment    8/26/2023 11:01 PM Marilee Addison Add [N61.603] Severe asthma with acute exacerbation, unspecified whether persistent     8/26/2023 11:01 PM Marilee Addison Add [R06.03] Respiratory distress       ED Disposition     ED Disposition   Admit    Condition   Stable    Date/Time   Sat Aug 26, 2023 11:27 PM    Comment   Date: 8/26/2023  Patient: Griffin Lozada  Admitted: 8/26/2023  9:19 PM  Attending Provider: Marilee Addison MD    Griffin Lozada or his authorized caregiver has made the decision for the patient to leave the emergency department against the advice o f his attending physician. He or his authorized caregiver has been informed and understands the inherent risks, including death. He or his authorized caregiver has decided to accept the responsibility for this decision. Marlo Ordoñez and all dominga cesar parties have been advised that he may return for further evaluation or treatment. His condition at time of discharge was critical.  Marlo Ordoñez had current vital signs as follows:  /83 (BP Location: Left arm)   Pulse 94   Temp 97.6 ° F (36.4 °C) (Oral)   Resp 18            Follow-up Information    None         Patient's Medications    No medications on file       No discharge procedures on file.     PDMP Review     None          ED Provider  Electronically Signed by           Jo Michelle MD  08/26/23 2549

## 2023-08-27 NOTE — PLAN OF CARE
Problem: INFECTION - ADULT  Goal: Absence or prevention of progression during hospitalization  Description: INTERVENTIONS:  - Assess and monitor for signs and symptoms of infection  - Monitor lab/diagnostic results  - Monitor all insertion sites, i.e. indwelling lines, tubes, and drains  - Monitor endotracheal if appropriate and nasal secretions for changes in amount and color  - [place] appropriate cooling/warming therapies per order  - Administer medications as ordered  - Instruct and encourage patient and family to use good hand hygiene technique  - Identify and instruct in appropriate isolation precautions for identified infection/condition  Outcome: Progressing.